# Patient Record
Sex: FEMALE | Race: WHITE | Employment: OTHER | URBAN - METROPOLITAN AREA
[De-identification: names, ages, dates, MRNs, and addresses within clinical notes are randomized per-mention and may not be internally consistent; named-entity substitution may affect disease eponyms.]

---

## 2018-11-06 DIAGNOSIS — Z12.4 SCREENING FOR MALIGNANT NEOPLASM OF CERVIX: Primary | ICD-10-CM

## 2018-11-07 ENCOUNTER — HOSPITAL ENCOUNTER (OUTPATIENT)
Facility: CLINIC | Age: 43
End: 2018-11-07
Attending: DENTIST | Admitting: DENTIST
Payer: MEDICARE

## 2018-12-04 RX ORDER — CEFAZOLIN SODIUM 1 G/3ML
1 INJECTION, POWDER, FOR SOLUTION INTRAMUSCULAR; INTRAVENOUS SEE ADMIN INSTRUCTIONS
Status: CANCELLED | OUTPATIENT
Start: 2018-12-04

## 2018-12-04 RX ORDER — POLYETHYLENE GLYCOL 3350 17 G/17G
1 POWDER, FOR SOLUTION ORAL DAILY PRN
COMMUNITY
End: 2018-12-06 | Stop reason: HOSPADM

## 2018-12-04 RX ORDER — CEFAZOLIN SODIUM 2 G/100ML
2 INJECTION, SOLUTION INTRAVENOUS
Status: CANCELLED | OUTPATIENT
Start: 2018-12-04

## 2019-07-22 DIAGNOSIS — Z12.4 SCREENING FOR CERVICAL CANCER: Primary | ICD-10-CM

## 2019-08-06 RX ORDER — POLYETHYLENE GLYCOL 3350 17 G/17G
1 POWDER, FOR SOLUTION ORAL 2 TIMES DAILY
COMMUNITY

## 2019-08-06 RX ORDER — ACETAMINOPHEN 325 MG/1
325-650 TABLET ORAL EVERY 6 HOURS PRN
COMMUNITY

## 2019-08-06 RX ORDER — PRIMIDONE 50 MG/1
300 TABLET ORAL 2 TIMES DAILY
Status: ON HOLD | COMMUNITY
End: 2024-04-11

## 2019-08-06 RX ORDER — CALAMINE, MENTHOL, WHITE PETROLATUM, ZINC OXIDE .5; .2; 69; 19.5 G/100G; G/100G; G/100G; G/100G
PASTE TOPICAL 4 TIMES DAILY PRN
COMMUNITY

## 2019-08-06 RX ORDER — BISACODYL 10 MG
10 SUPPOSITORY, RECTAL RECTAL DAILY PRN
COMMUNITY

## 2019-08-06 RX ORDER — PRIMIDONE 50 MG/1
300 TABLET ORAL 2 TIMES DAILY
COMMUNITY

## 2019-08-06 RX ORDER — CIPROFLOXACIN AND DEXAMETHASONE 3; 1 MG/ML; MG/ML
4 SUSPENSION/ DROPS AURICULAR (OTIC) PRN
COMMUNITY

## 2019-08-06 RX ORDER — MORPHINE SULFATE 20 MG/5ML
20 SOLUTION ORAL EVERY 12 HOURS PRN
COMMUNITY
End: 2024-04-03

## 2019-08-06 RX ORDER — NYSTATIN 100000 U/G
CREAM TOPICAL AT BEDTIME
COMMUNITY

## 2019-08-06 RX ORDER — FLUCONAZOLE 40 MG/ML
POWDER, FOR SUSPENSION ORAL DAILY
COMMUNITY
End: 2024-04-03

## 2019-08-06 RX ORDER — MEDROXYPROGESTERONE ACETATE 150 MG/ML
150 INJECTION, SUSPENSION INTRAMUSCULAR
COMMUNITY

## 2019-08-06 RX ORDER — LEVETIRACETAM 100 MG/ML
20 SOLUTION ORAL 2 TIMES DAILY
COMMUNITY

## 2019-08-06 RX ORDER — BACLOFEN 10 MG/1
15 TABLET ORAL 3 TIMES DAILY
COMMUNITY

## 2019-08-07 ENCOUNTER — DOCUMENTATION ONLY (OUTPATIENT)
Dept: OTHER | Facility: CLINIC | Age: 44
End: 2019-08-07

## 2019-08-07 ENCOUNTER — ANESTHESIA EVENT (OUTPATIENT)
Dept: SURGERY | Facility: CLINIC | Age: 44
End: 2019-08-07
Payer: MEDICARE

## 2019-08-08 ENCOUNTER — HOSPITAL ENCOUNTER (OUTPATIENT)
Facility: CLINIC | Age: 44
Discharge: HOME | End: 2019-08-08
Attending: OBSTETRICS & GYNECOLOGY | Admitting: OBSTETRICS & GYNECOLOGY
Payer: MEDICARE

## 2019-08-08 ENCOUNTER — APPOINTMENT (OUTPATIENT)
Dept: CT IMAGING | Facility: CLINIC | Age: 44
End: 2019-08-08
Attending: DENTIST
Payer: MEDICARE

## 2019-08-08 ENCOUNTER — ANESTHESIA (OUTPATIENT)
Dept: SURGERY | Facility: CLINIC | Age: 44
End: 2019-08-08
Payer: MEDICARE

## 2019-08-08 VITALS
TEMPERATURE: 97.3 F | DIASTOLIC BLOOD PRESSURE: 107 MMHG | HEART RATE: 97 BPM | SYSTOLIC BLOOD PRESSURE: 146 MMHG | WEIGHT: 67.9 LBS | RESPIRATION RATE: 14 BRPM | OXYGEN SATURATION: 99 % | HEIGHT: 58 IN | BODY MASS INDEX: 14.25 KG/M2

## 2019-08-08 DIAGNOSIS — K05.6 PERIODONTAL DISEASE: Primary | ICD-10-CM

## 2019-08-08 LAB
B-HCG SERPL-ACNC: <1 IU/L (ref 0–5)
GLUCOSE SERPL-MCNC: 80 MG/DL (ref 70–99)

## 2019-08-08 PROCEDURE — 87624 HPV HI-RISK TYP POOLED RSLT: CPT | Performed by: OBSTETRICS & GYNECOLOGY

## 2019-08-08 PROCEDURE — 25000566 ZZH SEVOFLURANE, EA 15 MIN: Performed by: OBSTETRICS & GYNECOLOGY

## 2019-08-08 PROCEDURE — 25000128 H RX IP 250 OP 636: Performed by: NURSE ANESTHETIST, CERTIFIED REGISTERED

## 2019-08-08 PROCEDURE — 71000014 ZZH RECOVERY PHASE 1 LEVEL 2 FIRST HR: Performed by: OBSTETRICS & GYNECOLOGY

## 2019-08-08 PROCEDURE — 36415 COLL VENOUS BLD VENIPUNCTURE: CPT | Performed by: OBSTETRICS & GYNECOLOGY

## 2019-08-08 PROCEDURE — 88175 CYTOPATH C/V AUTO FLUID REDO: CPT | Performed by: OBSTETRICS & GYNECOLOGY

## 2019-08-08 PROCEDURE — 36000051 ZZH SURGERY LEVEL 2 1ST 30 MIN - UMMC: Performed by: OBSTETRICS & GYNECOLOGY

## 2019-08-08 PROCEDURE — 37000009 ZZH ANESTHESIA TECHNICAL FEE, EACH ADDTL 15 MIN: Performed by: OBSTETRICS & GYNECOLOGY

## 2019-08-08 PROCEDURE — 37000008 ZZH ANESTHESIA TECHNICAL FEE, 1ST 30 MIN: Performed by: OBSTETRICS & GYNECOLOGY

## 2019-08-08 PROCEDURE — 25800030 ZZH RX IP 258 OP 636: Performed by: STUDENT IN AN ORGANIZED HEALTH CARE EDUCATION/TRAINING PROGRAM

## 2019-08-08 PROCEDURE — 25000125 ZZHC RX 250: Performed by: STUDENT IN AN ORGANIZED HEALTH CARE EDUCATION/TRAINING PROGRAM

## 2019-08-08 PROCEDURE — 25000132 ZZH RX MED GY IP 250 OP 250 PS 637: Mod: GY | Performed by: DENTIST

## 2019-08-08 PROCEDURE — 70486 CT MAXILLOFACIAL W/O DYE: CPT

## 2019-08-08 PROCEDURE — 25000128 H RX IP 250 OP 636: Performed by: DENTIST

## 2019-08-08 PROCEDURE — 71000027 ZZH RECOVERY PHASE 2 EACH 15 MINS: Performed by: OBSTETRICS & GYNECOLOGY

## 2019-08-08 PROCEDURE — 82947 ASSAY GLUCOSE BLOOD QUANT: CPT | Performed by: OBSTETRICS & GYNECOLOGY

## 2019-08-08 PROCEDURE — 40000171 ZZH STATISTIC PRE-PROCEDURE ASSESSMENT III: Performed by: OBSTETRICS & GYNECOLOGY

## 2019-08-08 PROCEDURE — 27210794 ZZH OR GENERAL SUPPLY STERILE: Performed by: OBSTETRICS & GYNECOLOGY

## 2019-08-08 PROCEDURE — 84702 CHORIONIC GONADOTROPIN TEST: CPT | Performed by: OBSTETRICS & GYNECOLOGY

## 2019-08-08 PROCEDURE — 25000125 ZZHC RX 250: Performed by: NURSE ANESTHETIST, CERTIFIED REGISTERED

## 2019-08-08 PROCEDURE — G0476 HPV COMBO ASSAY CA SCREEN: HCPCS | Performed by: OBSTETRICS & GYNECOLOGY

## 2019-08-08 PROCEDURE — 36000053 ZZH SURGERY LEVEL 2 EA 15 ADDTL MIN - UMMC: Performed by: OBSTETRICS & GYNECOLOGY

## 2019-08-08 PROCEDURE — 25000128 H RX IP 250 OP 636: Performed by: STUDENT IN AN ORGANIZED HEALTH CARE EDUCATION/TRAINING PROGRAM

## 2019-08-08 RX ORDER — MEPERIDINE HYDROCHLORIDE 25 MG/ML
12.5 INJECTION INTRAMUSCULAR; INTRAVENOUS; SUBCUTANEOUS
Status: DISCONTINUED | OUTPATIENT
Start: 2019-08-08 | End: 2019-08-08 | Stop reason: HOSPADM

## 2019-08-08 RX ORDER — CEFAZOLIN SODIUM 500 MG/2.2ML
500 INJECTION, POWDER, FOR SOLUTION INTRAMUSCULAR; INTRAVENOUS SEE ADMIN INSTRUCTIONS
Status: DISCONTINUED | OUTPATIENT
Start: 2019-08-08 | End: 2019-08-08 | Stop reason: HOSPADM

## 2019-08-08 RX ORDER — NALOXONE HYDROCHLORIDE 0.4 MG/ML
.1-.4 INJECTION, SOLUTION INTRAMUSCULAR; INTRAVENOUS; SUBCUTANEOUS
Status: DISCONTINUED | OUTPATIENT
Start: 2019-08-08 | End: 2019-08-08 | Stop reason: HOSPADM

## 2019-08-08 RX ORDER — ONDANSETRON 4 MG/1
4 TABLET, ORALLY DISINTEGRATING ORAL EVERY 30 MIN PRN
Status: DISCONTINUED | OUTPATIENT
Start: 2019-08-08 | End: 2019-08-08 | Stop reason: HOSPADM

## 2019-08-08 RX ORDER — LIDOCAINE HYDROCHLORIDE 20 MG/ML
INJECTION, SOLUTION INFILTRATION; PERINEURAL PRN
Status: DISCONTINUED | OUTPATIENT
Start: 2019-08-08 | End: 2019-08-08

## 2019-08-08 RX ORDER — CHLORHEXIDINE GLUCONATE ORAL RINSE 1.2 MG/ML
SOLUTION DENTAL
Qty: 473 ML | Refills: 0 | Status: SHIPPED | OUTPATIENT
Start: 2019-08-08

## 2019-08-08 RX ORDER — PROPOFOL 10 MG/ML
INJECTION, EMULSION INTRAVENOUS PRN
Status: DISCONTINUED | OUTPATIENT
Start: 2019-08-08 | End: 2019-08-08

## 2019-08-08 RX ORDER — GLYCOPYRROLATE 0.2 MG/ML
INJECTION, SOLUTION INTRAMUSCULAR; INTRAVENOUS PRN
Status: DISCONTINUED | OUTPATIENT
Start: 2019-08-08 | End: 2019-08-08

## 2019-08-08 RX ORDER — CHLORHEXIDINE GLUCONATE ORAL RINSE 1.2 MG/ML
SOLUTION DENTAL PRN
Status: DISCONTINUED | OUTPATIENT
Start: 2019-08-08 | End: 2019-08-08 | Stop reason: HOSPADM

## 2019-08-08 RX ORDER — ONDANSETRON 2 MG/ML
4 INJECTION INTRAMUSCULAR; INTRAVENOUS EVERY 30 MIN PRN
Status: DISCONTINUED | OUTPATIENT
Start: 2019-08-08 | End: 2019-08-08 | Stop reason: HOSPADM

## 2019-08-08 RX ORDER — ONDANSETRON 2 MG/ML
INJECTION INTRAMUSCULAR; INTRAVENOUS PRN
Status: DISCONTINUED | OUTPATIENT
Start: 2019-08-08 | End: 2019-08-08

## 2019-08-08 RX ORDER — CEFAZOLIN SODIUM 500 MG/2.2ML
500 INJECTION, POWDER, FOR SOLUTION INTRAMUSCULAR; INTRAVENOUS
Status: COMPLETED | OUTPATIENT
Start: 2019-08-08 | End: 2019-08-08

## 2019-08-08 RX ORDER — DEXAMETHASONE SODIUM PHOSPHATE 4 MG/ML
INJECTION, SOLUTION INTRA-ARTICULAR; INTRALESIONAL; INTRAMUSCULAR; INTRAVENOUS; SOFT TISSUE PRN
Status: DISCONTINUED | OUTPATIENT
Start: 2019-08-08 | End: 2019-08-08

## 2019-08-08 RX ORDER — FENTANYL CITRATE 50 UG/ML
25-50 INJECTION, SOLUTION INTRAMUSCULAR; INTRAVENOUS
Status: DISCONTINUED | OUTPATIENT
Start: 2019-08-08 | End: 2019-08-08 | Stop reason: HOSPADM

## 2019-08-08 RX ORDER — LIDOCAINE HYDROCHLORIDE 40 MG/ML
INJECTION, SOLUTION RETROBULBAR PRN
Status: DISCONTINUED | OUTPATIENT
Start: 2019-08-08 | End: 2019-08-08

## 2019-08-08 RX ORDER — KETAMINE HYDROCHLORIDE 10 MG/ML
INJECTION, SOLUTION INTRAMUSCULAR; INTRAVENOUS PRN
Status: DISCONTINUED | OUTPATIENT
Start: 2019-08-08 | End: 2019-08-08

## 2019-08-08 RX ORDER — PROPOFOL 10 MG/ML
INJECTION, EMULSION INTRAVENOUS CONTINUOUS PRN
Status: DISCONTINUED | OUTPATIENT
Start: 2019-08-08 | End: 2019-08-08

## 2019-08-08 RX ORDER — KETOROLAC TROMETHAMINE 30 MG/ML
INJECTION, SOLUTION INTRAMUSCULAR; INTRAVENOUS PRN
Status: DISCONTINUED | OUTPATIENT
Start: 2019-08-08 | End: 2019-08-08

## 2019-08-08 RX ORDER — KETOROLAC TROMETHAMINE 30 MG/ML
30 INJECTION, SOLUTION INTRAMUSCULAR; INTRAVENOUS EVERY 6 HOURS PRN
Status: DISCONTINUED | OUTPATIENT
Start: 2019-08-08 | End: 2019-08-08 | Stop reason: HOSPADM

## 2019-08-08 RX ORDER — FENTANYL CITRATE 50 UG/ML
INJECTION, SOLUTION INTRAMUSCULAR; INTRAVENOUS PRN
Status: DISCONTINUED | OUTPATIENT
Start: 2019-08-08 | End: 2019-08-08

## 2019-08-08 RX ORDER — SODIUM CHLORIDE, SODIUM LACTATE, POTASSIUM CHLORIDE, CALCIUM CHLORIDE 600; 310; 30; 20 MG/100ML; MG/100ML; MG/100ML; MG/100ML
INJECTION, SOLUTION INTRAVENOUS CONTINUOUS
Status: DISCONTINUED | OUTPATIENT
Start: 2019-08-08 | End: 2019-08-08 | Stop reason: HOSPADM

## 2019-08-08 RX ORDER — SODIUM CHLORIDE, SODIUM LACTATE, POTASSIUM CHLORIDE, CALCIUM CHLORIDE 600; 310; 30; 20 MG/100ML; MG/100ML; MG/100ML; MG/100ML
INJECTION, SOLUTION INTRAVENOUS CONTINUOUS PRN
Status: DISCONTINUED | OUTPATIENT
Start: 2019-08-08 | End: 2019-08-08

## 2019-08-08 RX ADMIN — ROCURONIUM BROMIDE 10 MG: 10 INJECTION INTRAVENOUS at 12:31

## 2019-08-08 RX ADMIN — CEFAZOLIN 500 MG: 225 INJECTION, POWDER, FOR SOLUTION INTRAMUSCULAR; INTRAVENOUS at 14:44

## 2019-08-08 RX ADMIN — SUGAMMADEX 60 MG: 100 INJECTION, SOLUTION INTRAVENOUS at 15:29

## 2019-08-08 RX ADMIN — ROCURONIUM BROMIDE 10 MG: 10 INJECTION INTRAVENOUS at 14:18

## 2019-08-08 RX ADMIN — FENTANYL CITRATE 25 MCG: 50 INJECTION, SOLUTION INTRAMUSCULAR; INTRAVENOUS at 13:57

## 2019-08-08 RX ADMIN — LIDOCAINE HYDROCHLORIDE 3 ML: 40 INJECTION, SOLUTION RETROBULBAR; TOPICAL at 12:15

## 2019-08-08 RX ADMIN — LIDOCAINE HYDROCHLORIDE 5 ML: 20 INJECTION, SOLUTION INFILTRATION; PERINEURAL at 12:15

## 2019-08-08 RX ADMIN — KETOROLAC TROMETHAMINE 15 MG: 30 INJECTION, SOLUTION INTRAMUSCULAR at 15:02

## 2019-08-08 RX ADMIN — GLYCOPYRROLATE 0.2 MG: 0.2 INJECTION, SOLUTION INTRAMUSCULAR; INTRAVENOUS at 11:56

## 2019-08-08 RX ADMIN — ROCURONIUM BROMIDE 10 MG: 10 INJECTION INTRAVENOUS at 13:38

## 2019-08-08 RX ADMIN — ROCURONIUM BROMIDE 10 MG: 10 INJECTION INTRAVENOUS at 13:15

## 2019-08-08 RX ADMIN — CEFAZOLIN 500 MG: 225 INJECTION, POWDER, FOR SOLUTION INTRAMUSCULAR; INTRAVENOUS at 12:42

## 2019-08-08 RX ADMIN — KETAMINE HYDROCHLORIDE 15 MG: 10 INJECTION, SOLUTION INTRAMUSCULAR; INTRAVENOUS at 11:56

## 2019-08-08 RX ADMIN — FENTANYL CITRATE 25 MCG: 50 INJECTION, SOLUTION INTRAMUSCULAR; INTRAVENOUS at 13:07

## 2019-08-08 RX ADMIN — ROCURONIUM BROMIDE 10 MG: 10 INJECTION INTRAVENOUS at 14:39

## 2019-08-08 RX ADMIN — ROCURONIUM BROMIDE 20 MG: 10 INJECTION INTRAVENOUS at 12:41

## 2019-08-08 RX ADMIN — LIDOCAINE HYDROCHLORIDE 3 ML: 40 INJECTION, SOLUTION RETROBULBAR; TOPICAL at 11:56

## 2019-08-08 RX ADMIN — ONDANSETRON 4 MG: 2 INJECTION INTRAMUSCULAR; INTRAVENOUS at 14:51

## 2019-08-08 RX ADMIN — PROPOFOL 80 MG: 10 INJECTION, EMULSION INTRAVENOUS at 12:20

## 2019-08-08 RX ADMIN — PROPOFOL 75 MCG/KG/MIN: 10 INJECTION, EMULSION INTRAVENOUS at 13:51

## 2019-08-08 RX ADMIN — KETAMINE HYDROCHLORIDE 10 MG: 10 INJECTION, SOLUTION INTRAMUSCULAR; INTRAVENOUS at 12:05

## 2019-08-08 RX ADMIN — SODIUM CHLORIDE, POTASSIUM CHLORIDE, SODIUM LACTATE AND CALCIUM CHLORIDE: 600; 310; 30; 20 INJECTION, SOLUTION INTRAVENOUS at 11:56

## 2019-08-08 RX ADMIN — DEXAMETHASONE SODIUM PHOSPHATE 4 MG: 4 INJECTION, SOLUTION INTRAMUSCULAR; INTRAVENOUS at 12:43

## 2019-08-08 RX ADMIN — MIDAZOLAM 0.5 MG: 1 INJECTION INTRAMUSCULAR; INTRAVENOUS at 11:56

## 2019-08-08 RX ADMIN — MIDAZOLAM 0.5 MG: 1 INJECTION INTRAMUSCULAR; INTRAVENOUS at 12:05

## 2019-08-08 ASSESSMENT — MIFFLIN-ST. JEOR: SCORE: 852.75

## 2019-08-08 NOTE — BRIEF OP NOTE
[facility], [place]    Brief Operative Note    Pre-operative diagnosis: Dental Caries, Periodontal Disease  Post-operative diagnosis Periodontal disease, severe trismus  Procedure: Procedure(s):  Breast and Pelvic Exam Under Anesthesia, Pap Smear  Bilateral Breast Exam Under Anesthesia  Dental Exam, Periodontal Therapy and Fluoride Treatment  Surgeon: Surgeon(s) and Role:  Panel 1:     * Hortensia Recio MD - Primary     * Jelena Porter MD - Resident - Assisting  Panel 2:     * Hortensia Recio MD - Primary  Panel 3:     * Ladonna Colorado DDS - Primary      * Lorenzo Aranda DDS- Resident- Assisting  Anesthesia: General NETT  Estimated blood loss: 5 mL  Drains: None  Specimens:   ID Type Source Tests Collected by Time Destination   1 : pap smear Brushing Cervix HPV HIGH RISK TYPES DNA CERVICAL, PAP IMAGED THIN LAYER, DIAGNOSTIC Hortensia Recio MD 8/8/2019 12:22 PM      Findings:   None.  Complications: None.  Implants:  No implants   The patient was transported to radiology for Dental CT

## 2019-08-08 NOTE — DISCHARGE INSTRUCTIONS
Same-Day Surgery   Adult Discharge Orders & Instructions     For 24 hours after surgery:  1. Get plenty of rest.  A responsible adult must stay with you for at least 24 hours after you leave the hospital.   2. Pain medication can slow your reflexes. Do not drive or use heavy equipment.  If you have weakness or tingling, don't drive or use heavy equipment until this feeling goes away.  3. Mixing alcohol and pain medication can cause dizziness and slow your breathing. It can even be fatal. Do not drink alcohol while taking pain medication.  4. Avoid strenuous or risky activities.  Ask for help when climbing stairs.   5. You may feel lightheaded.  If so, sit for a few minutes before standing.  Have someone help you get up.   6. If you have nausea (feel sick to your stomach), drink only clear liquids such as apple juice, ginger ale, broth or 7-Up.  Rest may also help.  Be sure to drink enough fluids.  Move to a regular diet as you feel able. Take pain medications with a small amount of solid food, such as toast or crackers, to avoid nausea.   7. A slight fever is normal. Call the doctor if your fever is over 100 F (37.7 C) (taken under the tongue) or lasts longer than 24 hours.  8. You may have a dry mouth, muscle aches, trouble sleeping or a sore throat.  These symptoms should go away after 24 hours.  9. Do not make important or legal decisions.   Pain Management:      1. Take pain medication (if prescribed) for pain as directed by your physician.        2. WARNING: If the pain medication you have been prescribed contains Tylenol  (acetaminophen), DO NOT take additional doses of Tylenol (acetaminophen).     Call your doctor for any of the followin.  Signs of infection (fever, growing tenderness at the surgery site, severe pain, a large amount of drainage or bleeding, foul-smelling drainage, redness, swelling).    2.  It has been over 8 to 10 hours since surgery and you are still not able to urinate (pee).    3.   Headache for over 24 hours.    4.  Numbness, tingling or weakness the day after surgery (if you had spinal anesthesia).  To contact a doctor, call _____________________________________ or:      [phone] and ask for the Resident On Call for:          __________________________________________ (answered 24 hours a day)      Emergency Department:  [facility]: [phone]  [facility]: [phone]               Rev. 10/2014     If you use hormonal birth control (such as the pill, patch, ring or implants):  You will need a second form of birth control for 7 days (condoms, a diaphragm or contraceptive foam).  While in the surgery center, you received a medicine called Sugammadex.  Hormonal birth control (such as the pill, patch, ring or implants) may not work as well for a week after taking this medicine.

## 2019-08-08 NOTE — ANESTHESIA PREPROCEDURE EVALUATION
Anesthesia Pre-Procedure Evaluation    Patient: Ivanna Davis   MRN:     [id number] Gender:   female   Age:    44 year old :      1975        Preoperative Diagnosis: Dental Caries, Periodontal Disease   Procedure(s):  Pelvic Exam Under Anesthesia, Pap Smear  Bilateral Breast Exam Under Anesthesia  Dental Exam, Restorations, Radiographs, Dental Extractions, Periodontal Therapy and Biopsies     Past Medical History:   Diagnosis Date     Generalized convulsive epilepsy (H)      Infantile cerebral palsy (H)      Kyphoscoliosis      Mental retardation       Past Surgical History:   Procedure Laterality Date     BACK SURGERY       IR GASTRO JEJUNOSTOMY TUBE PLACEMENT            Anesthesia Evaluation     . Pt has had prior anesthetic. Type: General    History of anesthetic complications   - difficult intubation        ROS/MED HX    ENT/Pulmonary:  - neg pulmonary ROS     Neurologic:       Cardiovascular:         METS/Exercise Tolerance:     Hematologic:         Musculoskeletal:         GI/Hepatic:         Renal/Genitourinary:         Endo:         Psychiatric:         Infectious Disease:         Malignancy:         Other:                     JZG FV AN PHYSICAL EXAM    LABS:  CBC: No results found for: WBC, HGB, HCT, PLT  BMP: No results found for: NA, POTASSIUM, CHLORIDE, CO2, BUN, CR, GLC  COAGS: No results found for: PTT, INR, FIBR  POC: No results found for: BGM, HCG, HCGS  OTHER: No results found for: PH, LACT, A1C, DAVID, PHOS, MAG, ALBUMIN, PROTTOTAL, ALT, AST, GGT, ALKPHOS, BILITOTAL, BILIDIRECT, LIPASE, AMYLASE, CLAUDIA, TSH, T4, T3, CRP, SED     Preop Vitals    BP Readings from Last 3 Encounters:   No data found for BP    Pulse Readings from Last 3 Encounters:   No data found for Pulse      Resp Readings from Last 3 Encounters:   No data found for Resp    SpO2 Readings from Last 3 Encounters:   No data found for SpO2      Temp Readings from Last 1 Encounters:   No data found for Temp    Ht Readings from Last  1 Encounters:   No data found for Ht      Wt Readings from Last 1 Encounters:   No data found for Wt    There is no height or weight on file to calculate BMI.     LDA:        Assessment:   ASA SCORE: 3    H&P: History and physical reviewed and following examination; no interval change.   Smoking Status:  Non-Smoker/Unknown   NPO Status: NPO Appropriate     Plan:   Anes. Type:  General   Pre-Medication: None   Induction:  IV (Standard)   Airway: ETT; FOB; Nasal   Access/Monitoring: PIV   Maintenance: Balanced     Postop Plan:   Postop Pain: Opioids; NSAID  Postop Sedation/Airway: Not planned  Disposition: Outpatient     PONV Management:   Adult Risk Factors: Female, Non-Smoker, Postop Opioids   Prevention: Ondansetron, Dexamethasone                   Dorene Fuentes MD

## 2019-08-08 NOTE — ANESTHESIA POSTPROCEDURE EVALUATION
Anesthesia POST Procedure Evaluation    Patient: Ivanna Davis   MRN:     [id number] Gender:   female   Age:    44 year old :      1975        Preoperative Diagnosis: Dental Caries, Periodontal Disease   Procedure(s):  Breast and Pelvic Exam Under Anesthesia, Pap Smear  Bilateral Breast Exam Under Anesthesia  Dental Exam, Periodontal Therapy and Fluoride Treatment   Postop Comments: No value filed.       Anesthesia Type:  Not documented  General    Reportable Event: NO     PAIN: Uncomplicated   Sign Out status: Comfortable, Well controlled pain     PONV: No PONV   Sign Out status:  No Nausea or Vomiting     Neuro/Psych: Uneventful perioperative course   Sign Out Status: Preoperative baseline; Age appropriate mentation     Airway/Resp.: Uneventful perioperative course   Sign Out Status: Non labored breathing, age appropriate RR; Resp. Status within EXPECTED Parameters     CV: Uneventful perioperative course   Sign Out status: Appropriate BP and perfusion indices; Appropriate HR/Rhythm     Disposition:   Sign Out in:  PACU  Disposition:  Phase II; Home  Recovery Course: Uneventful  Follow-Up: Not required     Comments/Narrative:  Patient doing well post-operatively.  No significant issues.  Hemodynamically stable, pain well controlled, nausea well controlled.  Stable for discharge from the PACU             Last Anesthesia Record Vitals:  CRNA VITALS  2019 1440 - 2019 1540      2019             Pulse:  84    Ht Rate:  82    SpO2:  100 %    Resp Rate (observed):  11          Last PACU Vitals:  Vitals Value Taken Time   /85 2019  4:15 PM   Temp 36.4  C (97.5  F) 2019  4:15 PM   Pulse 58 2019  4:15 PM   Resp 57 2019  4:24 PM   SpO2 99 % 2019  4:27 PM   Temp src     NIBP     Pulse     SpO2     Resp     Temp     Ht Rate     Temp 2     Vitals shown include unvalidated device data.      Electronically Signed By: Dontae Alvarado MD, 2019, 4:28 PM

## 2019-08-08 NOTE — LETTER
8/15/2019         Ivanna Davis   [address]  [facility] [address]        Dear Ms. Davis's mother and care team:    The results of Ivanna's recent Pap smear and HPV were normal. This is good for 5 years or longer as per our discussion.     Results for orders placed or performed during the hospital encounter of 08/08/19   CT Dental wo Contrast    Narrative    CT DENTAL WO CONTRAST 8/8/2019 3:28 PM    History:  45 yo female with CP and severe trismus, with a hard stop  lolis opening.  Max opening is 14 mm.  Possible skeletal deformity  preventing jaw movement    Comparison:  None available.      TECHNIQUE: Multiplanar axial CT images through the maxilla and  mandible were obtained without the administration of intravenous  contrast.    FINDINGS:  No significant soft tissue swelling or mass of the mandible or  maxilla. No bony erosion. Alignment of the visualized portions of the  facial bones appears normal. No abnormality of the temporomandibular  joints. Maloriented right maxillary canine. Mild periapical lucency  associated with the roots of the first right maxillary premolar.  Unerupted versus partially erupted bilateral maxillary and mandibular  posterior molars.    Mild frontal and sphenoid sinus mucosal thickening. Partially left  nasal intubation.      Impression    IMPRESSION:  1. No osseous temporomandibular joint anomaly.  2. Maloriented right maxillary canine.  3. Mild periapical abscess associated with the first right maxillary  premolar.    MICHAELA JARRELL MD   HCG quantitative pregnancy   Result Value Ref Range    HCG Quantitative Serum <1 0 - 5 IU/L   Glucose   Result Value Ref Range    Glucose 80 70 - 99 mg/dL   HPV High Risk Types DNA Cervical   Result Value Ref Range    HPV Source SurePath     HPV 16 DNA Negative NEG^Negative    HPV 18 DNA Negative NEG^Negative    Other HR HPV Negative NEG^Negative    Final Diagnosis This patient's sample is negative for HPV DNA.     Specimen  Description Cervical Cells    PAP imaged thin layer, diagnostic   Result Value Ref Range    PAP NIL     Copath Report         Patient Name: REBECCA QUINN  MR#: [id number]  Specimen #: [id number]  Collected: 8/8/2019  Received: 8/8/2019  Reported: 8/12/2019 09:54  Ordering Phy(s): JAZMIN VENCES    For improved result formatting, select 'View Enhanced Report Format' under   Linked Documents section.    SPECIMEN/STAIN PROCESS:  Pap Imaged thin layer prep diagnostic (SurePath, FocalPoint with guided   screening)       Pap-Cyto x 1, HPV ordered x 1    SOURCE: Cervical  ----------------------------------------------------------------   Pap Imaged thin layer prep diagnostic (SurePath, FocalPoint with guided   screening)  SPECIMEN ADEQUACY:  Satisfactory for evaluation.  -Transformation zone component absent.    CYTOLOGIC INTERPRETATION:    Negative for intraepithelial lesion or malignancy    Electronically signed out by:  FAUZIA Valles (ASCP)    CLINICAL HISTORY:    Exam Note:: Dental caries and periodontal disease,    Papanicolaou Test Limitations:  Cervical cytology is a screening test with   limited s ensitivity; regular  screening is critical for cancer prevention; Pap tests are primarily   effective for the diagnosis/prevention of  squamous cell carcinoma, not adenocarcinomas or other cancers.    The technical component of this testing was completed at the [facility], with the professional component performed   at the [facility], [address] [place] [phone] ([phone])    COLLECTION SITE:  Client:  [facility]  Location: [facility] (B)             Please note that test explanations are brief and do not reflect all diagnostic uses.  If you have any questions or concerns, please call the clinic at [phone].      Sincerely,      Jazmin  MD Hemal

## 2019-08-08 NOTE — ANESTHESIA CARE TRANSFER NOTE
Patient: Ivanna Davis    Procedure(s):  Breast and Pelvic Exam Under Anesthesia, Pap Smear  Bilateral Breast Exam Under Anesthesia  Dental Exam, Periodontal Therapy and Fluoride Treatment    Diagnosis: Dental Caries, Periodontal Disease  Diagnosis Additional Information: No value filed.    Anesthesia Type:   General     Note:  Airway :Nasal Cannula  Patient transferred to:PACU  Comments: Neuromuscular blockade reversed after TOF 4/4, spontaneous respirations, adequate tidal volumes, followed commands to voice, extubated atraumatically, airway patent after extubation.  Oxygen via nasal cannula at 3 liters per minute to PACU. Oxygen tubing connected to wall O2 in PACU, SpO2, NiBP, and EKG monitors and alarms on and functioning, report on patient's clinical status given to PACU RN, RN questions answered. Handoff Report: Identifed the Patient, Identified the Reponsible Provider, Reviewed the pertinent medical history, Discussed the surgical course, Reviewed Intra-OP anesthesia mangement and issues during anesthesia, Set expectations for post-procedure period and Allowed opportunity for questions and acknowledgement of understanding      Vitals: (Last set prior to Anesthesia Care Transfer)    CRNA VITALS  8/8/2019 1440 - 8/8/2019 1540      8/8/2019             Pulse:  84    Ht Rate:  82    SpO2:  100 %    Resp Rate (observed):  11                Electronically Signed By: ZULEYMA Yoon CRNA  August 8, 2019  3:46 PM

## 2019-08-08 NOTE — OP NOTE
[facility]  Full Operative Note   Date of Service: 8/8/2019  Surgeon:  Hortensia Recio MD  Assistants:  Jelena Porter MD PGY1   Preop Dx:  Cerebral Palsy    Epilepsy    Intellectual disability    Requiring cervical cancer screening  Postop Dx:  Same, s/p pap smear    Procedure:  EUA, pap smear    Anesthesia: See anesthesia note  EBL:  None cc  IVF:  See dentistry note  UOP:  None  Specimens:  Endovaginal pap smear   Complications:  None apparent    Indications:   Ms. Ivanna Davis is a 44 year old with hx of CP and intellectual disability, who presented with need for cervical cancer screening and breast exam. Has never had a pelvic exam or pap smear. Also has never had a breast exam. Currently does not get a period on regular Depo provera. Exam under anesthesia and pap smear and breast exam were recommended. The risks, benefits, and alternatives were discussed with the patient's legal guardian (her mother, Lorena), and she agreed to proceed. Also receiving dental exam and cleaning at this time.     Findings:   Complete contracture of upper and lower extremities limiting positioning and pelvic exam.   EUA: Normal appearing external genitalia. Bimanual exam with very small 2-3cm uterus. Normal appearing vaginal mucosa. Unable to completely visualize cervix given patient positioning. Cervix seems to be flush with vaginal mucosa.  Reticular erythematous rash over right side of chest wall. Shared with anesthesia.   Breast Exam: Breasts smooth to palpation bilaterally. No nodularity or masses noted.     Procedure Details:   The patient was brought to the operating room where adequate anesthesia was administered. Exam under anesthesia revealed the findings noted above. A leo pediatric speculum was placed. An endovaginal pap smear was obtained. A breast exam under anesthesia was performed with above findings.     The patient tolerated the procedure well and was then prepared for dental procedure.   Hemal was present for the entirety of the procedure.     Jelena Porter MD PGY1  Obstetrics & Gynecology  08/08/19     Please fax pap smear results to patient's home/primary provider at [facility], Dr. Pretty Lancaster. Phone [phone], Fax [phone].     I was present for and supervised the entire procedure and exam.     Hortensia Recio

## 2019-08-09 NOTE — OP NOTE
Procedure Date: 08/08/2019      It was deemed necessary for this patient to be seen in the hospital operating room because of patient history of cerebral palsy and convulsive epilepsy and inability to be treated in a traditional dental clinic setting.  Risks, complications including but not limited to postoperative pain, swelling, bleeding, infection, temporary or permanent paresthesia or anesthesia of cranial nerve V3 lingual nerve, failure to resolve chief complaint, or need for additional procedures.  The patient and patient's guardian agreed to procedure as written.  Signed consent is in the chart.        PROCEDURES PERFORMED:  Under general anesthesia, the following operations were performed in the mouth:   Bilateral dental examination, prophylaxis and gross periodontal debridement:  Periodontal therapy and    fluoride varnish application.      ATTENDING PHYSICIAN:  Ladonna Colorado DDS      FIRST ASSISTANT DENTAL RESIDENT:  Lorenzo Aranda DDS      ANESTHESIOLOGIST:  Dr. Gates      SCRUB NURSE:  Franny.      CIRCULATING NURSE:  Judd.      ANESTHESIOLOGY RESIDENTS:  Dr. Campbell and Dr. Fuentes.        PREOPERATIVE DIAGNOSIS:  Suspected periodontal disease and dental caries.      POSTOPERATIVE DIAGNOSES:     1.  Generalized chronic moderate periodontitis with localized severe chronic periodontitis.   2.  Severe trismus and heavy attrition with limited opening.      DESCRIPTION OF PROCEDURE:  The patient was brought into the operating room and draped in the usual customary [facility] fashion.  Following the timeout procedures,  general anesthesia was administered through the patient's left naris.  The patient was given awake intubation due to difficulty with intubation and opening.  GYN performed a pelvic and breast exam and pap smear then turned the patient over to us.  Please see their operative report for details.  A bilateral dental exam was performed.  A moist throat pack was placed at  13:12.  Clinical examination revealed generalized heavy plaque and supra and subgingival calculus, generalized bleeding on probing,  periodontal pockets ranging from 3-6 mm and a 12 x 3 mm hyperkeratotic raised tissue on the lower lip due to lip biting. Tooth #6 is partially erupted palatally. The max opening is 14 mm with a hard stop.  Radiographic examination was limited to facial CT due to the patient's limited opening following the dental procedures.  Radiographic diagnosis is pending radiologist confirmation.  No apparent dental caries or radiolucencies.        The following procedures were performed:  Periodontal therapy was performed on all teeth using ultrasonic debridement, supragingival and subgingival scaling and root planing with rubber cup polishing and flossing.  Fluoride varnish was applied to all teeth.  The throat pack was removed with suction  at 14:54.  The oropharynx was inspected and found to be clear.  Estimated blood loss was 5 mL.  The attending doctor, Dr. Ian Guan, was present for the entire procedure.  The patient was transported to radiology for a dental CT then  Return to the operating room in order to be extubated.  She was then taken to the post-anesthesia care unit in good condition.         IAN GUAN DDS       As dictated by ALEN PEARL DDS            D: 2019   T: 2019   MT: TWILA      Name:     REBECCA QUINN   MRN:      [id number]        Account:        [id number]   :      1975           Procedure Date: 2019      Document: [id number]

## 2019-08-12 LAB
COPATH REPORT: NORMAL
PAP: NORMAL

## 2019-08-14 LAB
FINAL DIAGNOSIS: NORMAL
HPV HR 12 DNA CVX QL NAA+PROBE: NEGATIVE
HPV16 DNA SPEC QL NAA+PROBE: NEGATIVE
HPV18 DNA SPEC QL NAA+PROBE: NEGATIVE
SPECIMEN DESCRIPTION: NORMAL
SPECIMEN SOURCE CVX/VAG CYTO: NORMAL

## 2022-12-06 ENCOUNTER — ANESTHESIA EVENT (OUTPATIENT)
Dept: SURGERY | Facility: CLINIC | Age: 47
End: 2022-12-06
Payer: MEDICARE

## 2022-12-06 RX ORDER — FERROUS SULFATE 325(65) MG
325 TABLET ORAL 2 TIMES DAILY
COMMUNITY

## 2022-12-06 ASSESSMENT — ENCOUNTER SYMPTOMS: SEIZURES: 1

## 2022-12-07 ENCOUNTER — HOSPITAL ENCOUNTER (OUTPATIENT)
Facility: CLINIC | Age: 47
Discharge: NURSING, REHAB OR LONG-TERM CARE | End: 2022-12-07
Attending: DENTIST | Admitting: DENTIST
Payer: MEDICARE

## 2022-12-07 ENCOUNTER — ANESTHESIA (OUTPATIENT)
Dept: SURGERY | Facility: CLINIC | Age: 47
End: 2022-12-07
Payer: MEDICARE

## 2022-12-07 VITALS
TEMPERATURE: 97.6 F | SYSTOLIC BLOOD PRESSURE: 145 MMHG | OXYGEN SATURATION: 100 % | DIASTOLIC BLOOD PRESSURE: 95 MMHG | RESPIRATION RATE: 13 BRPM | BODY MASS INDEX: 17.59 KG/M2 | HEIGHT: 58 IN | WEIGHT: 83.78 LBS | HEART RATE: 76 BPM

## 2022-12-07 PROCEDURE — 258N000003 HC RX IP 258 OP 636: Performed by: STUDENT IN AN ORGANIZED HEALTH CARE EDUCATION/TRAINING PROGRAM

## 2022-12-07 PROCEDURE — 272N000001 HC OR GENERAL SUPPLY STERILE: Performed by: DENTIST

## 2022-12-07 PROCEDURE — 250N000011 HC RX IP 250 OP 636: Performed by: STUDENT IN AN ORGANIZED HEALTH CARE EDUCATION/TRAINING PROGRAM

## 2022-12-07 PROCEDURE — 250N000025 HC SEVOFLURANE, PER MIN: Performed by: DENTIST

## 2022-12-07 PROCEDURE — 250N000009 HC RX 250: Performed by: STUDENT IN AN ORGANIZED HEALTH CARE EDUCATION/TRAINING PROGRAM

## 2022-12-07 PROCEDURE — 370N000017 HC ANESTHESIA TECHNICAL FEE, PER MIN: Performed by: DENTIST

## 2022-12-07 PROCEDURE — 710N000010 HC RECOVERY PHASE 1, LEVEL 2, PER MIN: Performed by: DENTIST

## 2022-12-07 PROCEDURE — 710N000012 HC RECOVERY PHASE 2, PER MINUTE: Performed by: DENTIST

## 2022-12-07 PROCEDURE — 360N000075 HC SURGERY LEVEL 2, PER MIN: Performed by: DENTIST

## 2022-12-07 PROCEDURE — 999N000141 HC STATISTIC PRE-PROCEDURE NURSING ASSESSMENT: Performed by: DENTIST

## 2022-12-07 RX ORDER — SODIUM CHLORIDE, SODIUM LACTATE, POTASSIUM CHLORIDE, CALCIUM CHLORIDE 600; 310; 30; 20 MG/100ML; MG/100ML; MG/100ML; MG/100ML
INJECTION, SOLUTION INTRAVENOUS CONTINUOUS
Status: DISCONTINUED | OUTPATIENT
Start: 2022-12-07 | End: 2022-12-07 | Stop reason: HOSPADM

## 2022-12-07 RX ORDER — MEPERIDINE HYDROCHLORIDE 25 MG/ML
12.5 INJECTION INTRAMUSCULAR; INTRAVENOUS; SUBCUTANEOUS
Status: DISCONTINUED | OUTPATIENT
Start: 2022-12-07 | End: 2022-12-07 | Stop reason: HOSPADM

## 2022-12-07 RX ORDER — PROPOFOL 10 MG/ML
INJECTION, EMULSION INTRAVENOUS PRN
Status: DISCONTINUED | OUTPATIENT
Start: 2022-12-07 | End: 2022-12-07

## 2022-12-07 RX ORDER — FENTANYL CITRATE 50 UG/ML
INJECTION, SOLUTION INTRAMUSCULAR; INTRAVENOUS PRN
Status: DISCONTINUED | OUTPATIENT
Start: 2022-12-07 | End: 2022-12-07

## 2022-12-07 RX ORDER — DEXAMETHASONE SODIUM PHOSPHATE 4 MG/ML
INJECTION, SOLUTION INTRA-ARTICULAR; INTRALESIONAL; INTRAMUSCULAR; INTRAVENOUS; SOFT TISSUE PRN
Status: DISCONTINUED | OUTPATIENT
Start: 2022-12-07 | End: 2022-12-07

## 2022-12-07 RX ORDER — CHLORHEXIDINE GLUCONATE ORAL RINSE 1.2 MG/ML
10 SOLUTION DENTAL ONCE
Status: DISCONTINUED | OUTPATIENT
Start: 2022-12-07 | End: 2022-12-07 | Stop reason: HOSPADM

## 2022-12-07 RX ORDER — OXYMETAZOLINE HYDROCHLORIDE 0.05 G/100ML
SPRAY NASAL PRN
Status: DISCONTINUED | OUTPATIENT
Start: 2022-12-07 | End: 2022-12-07

## 2022-12-07 RX ORDER — FENTANYL CITRATE 50 UG/ML
25 INJECTION, SOLUTION INTRAMUSCULAR; INTRAVENOUS EVERY 5 MIN PRN
Status: DISCONTINUED | OUTPATIENT
Start: 2022-12-07 | End: 2022-12-07 | Stop reason: HOSPADM

## 2022-12-07 RX ORDER — FENTANYL CITRATE 50 UG/ML
50 INJECTION, SOLUTION INTRAMUSCULAR; INTRAVENOUS EVERY 5 MIN PRN
Status: DISCONTINUED | OUTPATIENT
Start: 2022-12-07 | End: 2022-12-07 | Stop reason: HOSPADM

## 2022-12-07 RX ORDER — ACETAMINOPHEN 325 MG/10.15ML
15 LIQUID ORAL ONCE
Status: DISCONTINUED | OUTPATIENT
Start: 2022-12-07 | End: 2022-12-07 | Stop reason: HOSPADM

## 2022-12-07 RX ORDER — FENTANYL CITRATE 50 UG/ML
25 INJECTION, SOLUTION INTRAMUSCULAR; INTRAVENOUS
Status: DISCONTINUED | OUTPATIENT
Start: 2022-12-07 | End: 2022-12-07 | Stop reason: HOSPADM

## 2022-12-07 RX ORDER — ONDANSETRON 4 MG/1
4 TABLET, ORALLY DISINTEGRATING ORAL EVERY 30 MIN PRN
Status: DISCONTINUED | OUTPATIENT
Start: 2022-12-07 | End: 2022-12-07 | Stop reason: HOSPADM

## 2022-12-07 RX ORDER — ONDANSETRON 2 MG/ML
4 INJECTION INTRAMUSCULAR; INTRAVENOUS EVERY 30 MIN PRN
Status: DISCONTINUED | OUTPATIENT
Start: 2022-12-07 | End: 2022-12-07 | Stop reason: HOSPADM

## 2022-12-07 RX ORDER — KETAMINE HYDROCHLORIDE 10 MG/ML
INJECTION INTRAMUSCULAR; INTRAVENOUS PRN
Status: DISCONTINUED | OUTPATIENT
Start: 2022-12-07 | End: 2022-12-07

## 2022-12-07 RX ORDER — LIDOCAINE HYDROCHLORIDE 20 MG/ML
INJECTION, SOLUTION INFILTRATION; PERINEURAL PRN
Status: DISCONTINUED | OUTPATIENT
Start: 2022-12-07 | End: 2022-12-07

## 2022-12-07 RX ORDER — ONDANSETRON 2 MG/ML
INJECTION INTRAMUSCULAR; INTRAVENOUS PRN
Status: DISCONTINUED | OUTPATIENT
Start: 2022-12-07 | End: 2022-12-07

## 2022-12-07 RX ORDER — SODIUM CHLORIDE, SODIUM LACTATE, POTASSIUM CHLORIDE, CALCIUM CHLORIDE 600; 310; 30; 20 MG/100ML; MG/100ML; MG/100ML; MG/100ML
INJECTION, SOLUTION INTRAVENOUS CONTINUOUS PRN
Status: DISCONTINUED | OUTPATIENT
Start: 2022-12-07 | End: 2022-12-07

## 2022-12-07 RX ORDER — HYDROMORPHONE HCL IN WATER/PF 6 MG/30 ML
0.2 PATIENT CONTROLLED ANALGESIA SYRINGE INTRAVENOUS EVERY 5 MIN PRN
Status: DISCONTINUED | OUTPATIENT
Start: 2022-12-07 | End: 2022-12-07 | Stop reason: HOSPADM

## 2022-12-07 RX ORDER — HYDROMORPHONE HCL IN WATER/PF 6 MG/30 ML
0.4 PATIENT CONTROLLED ANALGESIA SYRINGE INTRAVENOUS EVERY 5 MIN PRN
Status: DISCONTINUED | OUTPATIENT
Start: 2022-12-07 | End: 2022-12-07 | Stop reason: HOSPADM

## 2022-12-07 RX ORDER — KETOROLAC TROMETHAMINE 30 MG/ML
INJECTION, SOLUTION INTRAMUSCULAR; INTRAVENOUS PRN
Status: DISCONTINUED | OUTPATIENT
Start: 2022-12-07 | End: 2022-12-07

## 2022-12-07 RX ORDER — GLYCOPYRROLATE 0.2 MG/ML
INJECTION, SOLUTION INTRAMUSCULAR; INTRAVENOUS PRN
Status: DISCONTINUED | OUTPATIENT
Start: 2022-12-07 | End: 2022-12-07

## 2022-12-07 RX ADMIN — MIDAZOLAM 1 MG: 1 INJECTION INTRAMUSCULAR; INTRAVENOUS at 12:49

## 2022-12-07 RX ADMIN — PHENYLEPHRINE HYDROCHLORIDE 200 MCG: 10 INJECTION INTRAVENOUS at 12:29

## 2022-12-07 RX ADMIN — LIDOCAINE HYDROCHLORIDE 3 ML: 40 INJECTION, SOLUTION RETROBULBAR; TOPICAL at 11:47

## 2022-12-07 RX ADMIN — KETOROLAC TROMETHAMINE 30 MG: 30 INJECTION, SOLUTION INTRAMUSCULAR at 14:21

## 2022-12-07 RX ADMIN — Medication 40 MG: at 12:23

## 2022-12-07 RX ADMIN — Medication 20 MG: at 12:05

## 2022-12-07 RX ADMIN — DEXAMETHASONE SODIUM PHOSPHATE 8 MG: 4 INJECTION, SOLUTION INTRA-ARTICULAR; INTRALESIONAL; INTRAMUSCULAR; INTRAVENOUS; SOFT TISSUE at 13:41

## 2022-12-07 RX ADMIN — GLYCOPYRROLATE 0.2 MG: 0.2 INJECTION, SOLUTION INTRAMUSCULAR; INTRAVENOUS at 12:12

## 2022-12-07 RX ADMIN — PHENYLEPHRINE HYDROCHLORIDE 100 MCG: 10 INJECTION INTRAVENOUS at 12:38

## 2022-12-07 RX ADMIN — SODIUM CHLORIDE, POTASSIUM CHLORIDE, SODIUM LACTATE AND CALCIUM CHLORIDE: 600; 310; 30; 20 INJECTION, SOLUTION INTRAVENOUS at 12:00

## 2022-12-07 RX ADMIN — Medication 10 MG: at 12:14

## 2022-12-07 RX ADMIN — LIDOCAINE HYDROCHLORIDE 80 MG: 20 INJECTION, SOLUTION INFILTRATION; PERINEURAL at 12:14

## 2022-12-07 RX ADMIN — OXYMETAZOLINE HYDROCHLORIDE 2 SPRAY: 0.05 SPRAY NASAL at 12:19

## 2022-12-07 RX ADMIN — MIDAZOLAM 0.5 MG: 1 INJECTION INTRAMUSCULAR; INTRAVENOUS at 12:05

## 2022-12-07 RX ADMIN — SUGAMMADEX 200 MG: 100 INJECTION, SOLUTION INTRAVENOUS at 14:08

## 2022-12-07 RX ADMIN — ONDANSETRON 4 MG: 2 INJECTION INTRAMUSCULAR; INTRAVENOUS at 13:57

## 2022-12-07 RX ADMIN — FENTANYL CITRATE 100 MCG: 50 INJECTION, SOLUTION INTRAMUSCULAR; INTRAVENOUS at 12:14

## 2022-12-07 RX ADMIN — PROPOFOL 100 MG: 10 INJECTION, EMULSION INTRAVENOUS at 12:23

## 2022-12-07 RX ADMIN — MIDAZOLAM 0.5 MG: 1 INJECTION INTRAMUSCULAR; INTRAVENOUS at 12:12

## 2022-12-07 RX ADMIN — PHENYLEPHRINE HYDROCHLORIDE 100 MCG: 10 INJECTION INTRAVENOUS at 12:42

## 2022-12-07 ASSESSMENT — ACTIVITIES OF DAILY LIVING (ADL)
ADLS_ACUITY_SCORE: 43
ADLS_ACUITY_SCORE: 43
ADLS_ACUITY_SCORE: 35

## 2022-12-07 NOTE — ANESTHESIA CARE TRANSFER NOTE
Patient: Ivanna Davis    Procedure: Procedure(s):  Periodontal Therapy, Bilateral Dental Exam, 1 Dental Extractions, Debridement       Diagnosis: Dental caries [K02.9]  Dental infection [K04.7]  Unable to comply with treatment [Z91.199]  Diagnosis Additional Information: No value filed.    Anesthesia Type:   General     Note:    Oropharynx: oropharynx clear of all foreign objects  Level of Consciousness: awake  Oxygen Supplementation: face mask    Independent Airway: airway patency satisfactory and stable  Dentition: dentition unchanged  Vital Signs Stable: post-procedure vital signs reviewed and stable    Patient transferred to: PACU    Handoff Report: Identifed the Patient, Identified the Reponsible Provider, Reviewed the pertinent medical history, Discussed the surgical course, Reviewed Intra-OP anesthesia mangement and issues during anesthesia, Set expectations for post-procedure period and Allowed opportunity for questions and acknowledgement of understanding      Vitals:  Vitals Value Taken Time   /95 12/07/22 1445   Temp     Pulse 72 12/07/22 1445   Resp 10 12/07/22 1445   SpO2 100 % 12/07/22 1445   Vitals shown include unvalidated device data.    Electronically Signed By: Calvin Sebastian MD  December 7, 2022  2:47 PM

## 2022-12-07 NOTE — ANESTHESIA POSTPROCEDURE EVALUATION
Patient: Ivanna Davis    Procedure: Procedure(s):  Periodontal Therapy, Bilateral Dental Exam, 1 Dental Extractions, Debridement       Anesthesia Type:  General    Note:  Disposition: Outpatient   Postop Pain Control: Uneventful            Sign Out: Well controlled pain   PONV: No   Neuro/Psych: Uneventful            Sign Out: Acceptable/Baseline neuro status   Airway/Respiratory: Uneventful            Sign Out: Acceptable/Baseline resp. status   CV/Hemodynamics: Uneventful            Sign Out: Acceptable CV status; No obvious hypovolemia; No obvious fluid overload   Other NRE: NONE   DID A NON-ROUTINE EVENT OCCUR?            Last vitals:  Vitals Value Taken Time   /95 12/07/22 1445   Temp     Pulse 73 12/07/22 1447   Resp 12 12/07/22 1447   SpO2 100 % 12/07/22 1447   Vitals shown include unvalidated device data.    Electronically Signed By: Calvin Sebastian MD  December 7, 2022  2:47 PM

## 2022-12-07 NOTE — ANESTHESIA PREPROCEDURE EVALUATION
Anesthesia Pre-Procedure Evaluation    Patient: Ivanna Davis   MRN: [id number] : 1975        Procedure : Procedure(s):  Biopsies, Frenectomy, Gingivectomy, Alveoloplasty, Periodontal Therapy, Fluoride, Varnish in Mouth, Endodontic Therapy, Bilateral Dental Exam, Radiographs, Dental Restorations, Dental Extractions,  Endo Therapy          Past Medical History:   Diagnosis Date     Generalized convulsive epilepsy (H)      Infantile cerebral palsy (H)      Kyphoscoliosis      Mental retardation       Past Surgical History:   Procedure Laterality Date     BACK SURGERY       EXAM UNDER ANESTHESIA BREAST Bilateral 2019    Procedure: Bilateral Breast Exam Under Anesthesia;  Surgeon: Hortensia Recio MD;  Location: [facility] OR     EXAM UNDER ANESTHESIA PELVIC N/A 2019    Procedure: Breast and Pelvic Exam Under Anesthesia, Pap Smear;  Surgeon: Hortensia Recio MD;  Location: [facility] OR     EXAM UNDER ANESTHESIA, RESTORATIONS, EXTRACTION(S) DENTAL COMPLEX, COMBINED N/A 2019    Procedure: Dental Exam, Periodontal Therapy and Fluoride Treatment;  Surgeon: Ladonna Colorado DDS;  Location: [facility] OR     IR GASTRO JEJUNOSTOMY TUBE PLACEMENT        Allergies   Allergen Reactions     Latex       Social History     Tobacco Use     Smoking status: Never     Smokeless tobacco: Never   Substance Use Topics     Alcohol use: No      Wt Readings from Last 1 Encounters:   19 30.8 kg (67 lb 14.4 oz)        Anesthesia Evaluation   Pt has had prior anesthetic.     History of anesthetic complications  - difficult airway.  Very small mouth opening, history of many nasal fiberoptic intubations .    ROS/MED HX  ENT/Pulmonary:  - neg pulmonary ROS     Neurologic: Comment: Infantile cerebral palsy  Epilepsy on keppra    (+) seizures, Developmental delay,     Cardiovascular:  - neg cardiovascular ROS     METS/Exercise Tolerance:     Hematologic:     (+) anemia,     Musculoskeletal: Comment: Scoliosis      GI/Hepatic:  Comment: Constipation on bowel regimen  Hx of percutaneous GJ tube      Renal/Genitourinary: Comment: Hx of nephrolithiasis, hydronephrosis and nephrostomy tubes    On depo provera      Endo:  - neg endo ROS     Psychiatric/Substance Use:  - neg psychiatric ROS     Infectious Disease: Comment: Hx of candidemia and MRSA infection      Malignancy:  - neg malignancy ROS     Other:            Physical Exam    Airway   unable to assess          Respiratory Devices and Support         Dental           Cardiovascular   cardiovascular exam normal          Pulmonary   pulmonary exam normal                OUTSIDE LABS:  CBC: No results found for: WBC, HGB, HCT, PLT  BMP:   Lab Results   Component Value Date    GLC 80 08/08/2019     COAGS: No results found for: PTT, INR, FIBR  POC: No results found for: BGM, HCG, HCGS  HEPATIC: No results found for: ALBUMIN, PROTTOTAL, ALT, AST, GGT, ALKPHOS, BILITOTAL, BILIDIRECT, CLAUDIA  OTHER: No results found for: PH, LACT, A1C, DAVID, PHOS, MAG, LIPASE, AMYLASE, TSH, T4, T3, CRP, SED    Anesthesia Plan    ASA Status:  3      Anesthesia Type: General.     - Airway: ETT   Induction: Intravenous.   Maintenance: Balanced.   Techniques and Equipment:     - Airway: Nasal SUSANNAH, Fiberoptic Bronchoscope, Awake FOI/VL     - Lines/Monitors: BIS     Consents    Anesthesia Plan(s) and associated risks, benefits, and realistic alternatives discussed. Questions answered and patient/representative(s) expressed understanding.     - Discussed: Risks, Benefits and Alternatives for BOTH SEDATION and the PROCEDURE were discussed     - Discussed with:  Parent (Mother and/or Father)      - Extended Intubation/Ventilatory Support Discussed: No.      - Patient is DNR/DNI Status: No    Use of blood products discussed: No .     Postoperative Care    Pain management: Multi-modal analgesia.   PONV prophylaxis: Ondansetron (or other 5HT-3), Dexamethasone or Solumedrol     Comments:                Juvencio Bernal  MD

## 2022-12-07 NOTE — OR NURSING
Per Nuvia from Infectious Disease, pt no longer mees criteria for isolation pre-cautions.  Nuvia will update the chart status.

## 2022-12-07 NOTE — BRIEF OP NOTE
[facility]    Brief Operative Note    Pre-operative diagnosis: Dental caries [K02.9]  Dental infection [K04.7]  Unable to comply with treatment [Z91.199]  Post-operative diagnosis;  Chronic gingivitis and periodontal disease, severely limited opening    Procedure: Procedure(s):  Periodontal Therapy, Bilateral Dental Exam, 1 Dental Extractions, Debridement  Surgeon: Surgeon(s) and Role:     * Viki Ruiz DDS - Primary     * Crys Figueroa MD - Resident - Assisting     * Elizabeth Onofre MD - Resident - Assisting  Anesthesia: General   Estimated Blood Loss: 4ml    Drains: None  Specimens: * No specimens in log *  Findings:   None.  Complications: None.  Implants: * No implants in log *

## 2022-12-07 NOTE — ANESTHESIA PROCEDURE NOTES
Airway       Patient location during procedure: OR       Procedure Start/Stop Times: 12/7/2022 12:22 PM  Staff -        Anesthesiologist:  Calvin Sebastian MD       Resident/Fellow: Juvencio Bernal MD       Performed By: resident  Consent for Airway        Urgency: elective  Indications and Patient Condition       Indications for airway management: koffi-procedural       Induction type:awake      Final Airway Details       Final airway type: endotracheal airway       Successful airway: Nasal and SUSANNAH  Endotracheal Airway Details        ETT size (mm): 6.0       Cuffed: yes       Successful intubation technique: flexible bronchoscopy       Grade View of Cords: 1       Position: Center (right nare)       Measured from: nares       Secured at (cm): 25       Bite block used: None    Post intubation assessment        Placement verified by: capnometry, equal breath sounds and chest rise        Number of attempts at approach: 1       Number of other approaches attempted: 0       Secured with: plastic tape       Ease of procedure: easy       Dentition: Intact and Unchanged    Medication(s) Administered   Medication Administration Time: 12/7/2022 12:22 PM

## 2022-12-07 NOTE — ANESTHESIA CARE TRANSFER NOTE
Patient: Ivanna Davis    Procedure: Procedure(s):  Periodontal Therapy, Bilateral Dental Exam, 1 Dental Extractions, Debridement       Diagnosis: Dental caries [K02.9]  Dental infection [K04.7]  Unable to comply with treatment [Z91.199]  Diagnosis Additional Information: No value filed.    Anesthesia Type:   General     Note:    Oropharynx: oropharynx clear of all foreign objects and spontaneously breathing  Level of Consciousness: drowsy  Oxygen Supplementation: face mask  Level of Supplemental Oxygen (L/min / FiO2): 6  Independent Airway: airway patency satisfactory and stable  Dentition: S/P dental procedure  Vital Signs Stable: post-procedure vital signs reviewed and stable  Report to RN Given: handoff report given  Patient transferred to: PACU    Handoff Report: Identifed the Patient, Identified the Reponsible Provider, Reviewed the pertinent medical history, Discussed the surgical course, Reviewed Intra-OP anesthesia mangement and issues during anesthesia, Set expectations for post-procedure period and Allowed opportunity for questions and acknowledgement of understanding      Vitals:  Vitals Value Taken Time   /89 12/07/22 1432   Temp 36.3    Pulse 76 12/07/22 1444   Resp 13 12/07/22 1444   SpO2 100 % 12/07/22 1444   Vitals shown include unvalidated device data.    Electronically Signed By: Juvencio Bernal MD  December 7, 2022  2:46 PM

## 2022-12-12 NOTE — BRIEF OP NOTE
It was deemed necessary for this patient to be seen in the hospital operating room because of inability to treat in a traditional dental clinic setting due to his unstable medical condition. Pedro Davis is a 45 year old female with past medical history of moderate MR, cerebral palsy, quadrapelgia, very limited opening, previous VRE             Allergies: No known allergies      Consent: Risks complications including but not limited to post-operative pain, swelling, bleeding, infection, temporary/permanent paresthesia/anesthesia of CN V3, lingual nerve, failure to resolve chief complaint. Patients' guardian agreed to procedure as written, and patient signed consent.         Names:     The attending physicians were Viki Ruiz DDS.      The first dental resident was Elizabeth Onofre DMD     The second dental resident was Crys Figueroa DMD        The anesthesiologist was Calvin Sebastian MD.      The CRNA was Manda Severude  (APRN CRNA).         Summary:     Under general anesthesia, the following operations were performed in the mouth: Dental examination prophy, extraction of #5    Diagnosis:     The preoperative diagnosis was dental caries.     The postoperative diagnosis was chronic gingivitis with severe periodontal disease.  Very heavy calculus noted.        General Anesthesia Start:     The patient was brought into the operating room and draped in the usual customary [facility] fashion. Following the time-out procedures, the patient was placed under General Anesthesia Care via oropharanxy. A moist throat pack x 2 was placed at 1300 hrs. Double throat pack used due to limited opening and very limited access to see throat.       Observations:     Clinical examination revealed grade 3 mobility for tooth #5, very heavy calculus.  Opening limited to 5 tongue blades, approx 10 mm.  Unable to capture radiographs or probe due to limited opening.  Due to limited opening very difficult  access for treatment. Unable to use radiographs to diagnose pathology.  Used clinical observations to deem treatment.  Difficult to use suction to deem all particles removed in oral cavity as limited view.   Aspiration a concern unless opening can be improved.  Consider igor-tox prior to dental to improve opening, consider a dental CT at next dental or try Pano in clinic for diagnosis prior to next dental     Local Anesthesia:     Procedure:     The following procedures were performed:  Periodontal therapy with heavy calculus removal using cavitron and hand instruments.  Finger polished with mint paste and flossed anterior teeth as only had access to do so.     Simple extractions using fingers for #5.  Prophy      Digital compression performed.      Throat packed removed at 1356 hrs.. The oropharynx was inspected and found to be clear. Estimated blood loss was 3 ml. The attending doctor, Dr. Ruiz was present for the entire procedure.

## 2024-03-13 NOTE — TELEPHONE ENCOUNTER
FUTURE VISIT INFORMATION      SURGERY INFORMATION:  Date: TBD    Appt per Neisha RN care coordinator at group home pt is at // Dental procedure by Ananya Amador procedure not yet scheduled procedure will be at [facility] // cardiac or pulmonary issues- none known     RECORDS REQUESTED FROM:       Primary Care Provider: Pretty Lancaster     Pertinent Medical History:

## 2024-03-25 NOTE — TELEPHONE ENCOUNTER
FUTURE VISIT INFORMATION      SURGERY INFORMATION:  Date: 4/3/2024  Location: [facility] OR   Surgeon:  Yael Heard DDS   Anesthesia Type:  General   Procedure: Bilateral dental exam, Dental radiograph, dental restorations, pulpotomy, root canal therapy, frenectomy, gingivectomy, Alveoloplasty, periodontal therapy, fluoride, varnish in the mouth, dental extractions       RECORDS REQUESTED FROM:       Primary Care Provider: Pretty Lancaster     Pertinent Medical History:

## 2024-03-26 ENCOUNTER — PRE VISIT (OUTPATIENT)
Dept: SURGERY | Facility: CLINIC | Age: 49
End: 2024-03-26

## 2024-04-03 ENCOUNTER — OFFICE VISIT (OUTPATIENT)
Dept: SURGERY | Facility: CLINIC | Age: 49
End: 2024-04-03
Payer: MEDICARE

## 2024-04-03 ENCOUNTER — ANESTHESIA EVENT (OUTPATIENT)
Dept: SURGERY | Facility: CLINIC | Age: 49
End: 2024-04-03
Payer: MEDICARE

## 2024-04-03 ENCOUNTER — PRE VISIT (OUTPATIENT)
Dept: SURGERY | Facility: CLINIC | Age: 49
End: 2024-04-03

## 2024-04-03 VITALS — HEIGHT: 58 IN | WEIGHT: 87.9 LBS | BODY MASS INDEX: 18.45 KG/M2

## 2024-04-03 DIAGNOSIS — Z01.818 PREOP EXAMINATION: Primary | ICD-10-CM

## 2024-04-03 DIAGNOSIS — K04.7 DENTAL INFECTION: ICD-10-CM

## 2024-04-03 PROCEDURE — 99204 OFFICE O/P NEW MOD 45 MIN: CPT | Performed by: NURSE PRACTITIONER

## 2024-04-03 RX ORDER — PSEUDOEPHEDRINE HCL 30 MG
TABLET ORAL EVERY 6 HOURS PRN
COMMUNITY
Start: 2022-12-30

## 2024-04-03 RX ORDER — LORAZEPAM 2 MG/ML
CONCENTRATE ORAL
COMMUNITY
Start: 2023-09-08

## 2024-04-03 ASSESSMENT — ENCOUNTER SYMPTOMS: SEIZURES: 1

## 2024-04-03 ASSESSMENT — LIFESTYLE VARIABLES: TOBACCO_USE: 0

## 2024-04-03 NOTE — H&P
Pre-Operative H & P     CC:  Preoperative exam to assess for increased cardiopulmonary risk while undergoing surgery and anesthesia.    Date of Encounter: 4/3/2024  Primary Care Physician:  Pretty Lancaster     Reason for visit:   Encounter Diagnoses   Name Primary?    Preop examination Yes    Dental infection        HPI  Ivanna DURAN Davis is a 49 year old female who presents for pre-operative H & P in preparation for  Procedure Information       Case: [id number] Date/Time: 04/11/24 1050    Procedure: Bilateral dental exam, Dental radiograph, dental restorations, pulpotomy, root canal therapy, frenectomy, gingivectomy, Alveoloplasty, periodontal therapy, fluoride, varnish in the mouth, dental extractions (Bilateral: Mouth)    Anesthesia type: General    Diagnosis: Dental infection [K04.7]    Pre-op diagnosis: Dental infection [K04.7]    Location:  OR 10 / [facility]    Providers: Yael Heard DDS            The patient presents to the PAC in person today with [facility] nursing assistant, Jordonbonnie Umanzor, in preparation for the above scheduled procedure with comorbid conditions including cerebral palsy, spastic quadriplegia, severe developmentally disabled, seizure disorder, anemia, GJ tube for nutritional support, kyphoscoliosis, and KNOWN DIFFICULT INTUBATION.     The patient has a dental infection and given her history of cerebral palsy with severe developmentally delayed, the above procedure has been scheduled under anesthesia as done in the past for previous dental work.       Patient is non-verbal.  Mr. Umanzor had very limited medical information so this provider contacted Ms. Lorena Sher LPN, at [facility] to complete interview.     History is obtained from the patient and chart review    Hx of abnormal bleeding or anti-platelet use: denies     Menstrual history: No LMP recorded (lmp unknown). (Menstrual status: Birth Control).: patient on Depo-provera Q 3 months.     Past Medical History  Past  Medical History:   Diagnosis Date    Generalized convulsive epilepsy (H)     Infantile cerebral palsy (H)     Kyphoscoliosis     Mental retardation        Past Surgical History  Past Surgical History:   Procedure Laterality Date    BACK SURGERY      EXAM UNDER ANESTHESIA BREAST Bilateral 8/8/2019    Procedure: Bilateral Breast Exam Under Anesthesia;  Surgeon: Hortensia Recio MD;  [facility]    EXAM UNDER ANESTHESIA PELVIC N/A 8/8/2019    Procedure: Breast and Pelvic Exam Under Anesthesia, Pap Smear;  Surgeon: Hortensia Recio MD;  [facility]    EXAM UNDER ANESTHESIA, RESTORATIONS, EXTRACTION(S) DENTAL COMPLEX, COMBINED N/A 8/8/2019    Procedure: Dental Exam, Periodontal Therapy and Fluoride Treatment;  Surgeon: Ladonna Colorado DDS;  [facility]    EXAM UNDER ANESTHESIA, RESTORATIONS, EXTRACTION(S) DENTAL COMPLEX, COMBINED N/A 12/7/2022    Procedure: Periodontal Therapy, Bilateral Dental Exam, 1 Dental Extractions, Debridement;  Surgeon: Viki Ruiz DDS;  [facility]    IR GASTRO JEJUNOSTOMY TUBE PLACEMENT         Prior to Admission Medications  PLEASE NOTE, MEDICATION [facility]ONC[facility]   Current Outpatient Medications   Medication Sig Dispense Refill    acetaminophen (TYLENOL) 325 MG tablet Take 325-650 mg by mouth every 6 hours as needed for mild pain      baclofen (LIORESAL) 10 MG tablet Take 10 mg by mouth 3 times daily      bisacodyl (DULCOLAX) 10 MG suppository Place 10 mg rectally daily as needed for constipation      chlorhexidine (PERIDEX) 0.12 % solution Swab or brush on to gums twice a day for 2 weeks. (Patient taking differently: Take 15 mLs by mouth 2 times daily Swab or brush on to gums twice a day for 2 weeks.) 473 mL 0    ciprofloxacin-dexamethasone (CIPRODEX) 0.3-0.1 % otic suspension 4 drops as needed      ergocalciferol (DRISDOL-D2) 8000 units/mL (200 mcg/mL) drops Take 8,000 Units by mouth once a week Fridays       ferrous sulfate (FEROSUL) 325 (65 Fe) MG tablet Take 325 mg by mouth 2 times daily Via g-tube      guaiFENesin (ROBITUSSIN) 100 MG/5ML SYRP Take 10 mLs by mouth every 4 hours as needed for cough      levETIRAcetam (KEPPRA) 100 MG/ML solution Take 20 mLs by mouth 2 times daily      LORazepam (ATIVAN) 2 MG/ML (HIGH CONC) oral solution TAKE 0.5ML (1MG) BUCALLY AS NEEDED FOR SEIZURE LASTING LONGER THAN 3 MINUTES. MAY REPEAT ONE DOSE AFTER 15 MINUTES IF NEEDED. *4 TOTAL FILLS*      medroxyPROGESTERone (DEPO-PROVERA) 150 MG/ML IM injection Inject 150 mg into the muscle every 3 months      Nutritional Supplements (ISOSOURCE 1.5 DAVID PO) Take by mouth 3 times daily      nystatin (MYCOSTATIN) 899375 UNIT/GM external cream Apply topically At Bedtime      polyethylene glycol (MIRALAX/GLYCOLAX) packet Take 1 packet by mouth 2 times daily      primidone (MYSOLINE) 50 MG tablet Take 300 mg by mouth 2 times daily      primidone (MYSOLINE) 50 MG tablet Take 300 mg by mouth 2 times daily      pseudoePHEDrine (SUDOGEST) 30 MG tablet every 6 hours as needed      Skin Protectants, Misc. (CALAZIME SKIN PROTECTANT) PSTE Externally apply topically 4 times daily as needed      SODIUM PHOSPHATES RE Place rectally as needed         Allergies  Allergies   Allergen Reactions    Latex        Social History  Social History     Socioeconomic History    Marital status: Single     Spouse name: Not on file    Number of children: Not on file    Years of education: Not on file    Highest education level: Not on file   Occupational History    Not on file   Tobacco Use    Smoking status: Never    Smokeless tobacco: Never   Substance and Sexual Activity    Alcohol use: No    Drug use: No    Sexual activity: Not on file   Other Topics Concern    Not on file   Social History Narrative    Not on file     Social Determinants of Health     Financial Resource Strain: Not on file   Food Insecurity: Not on file   Transportation Needs: Not on file   Physical  "Activity: Not on file   Stress: Not on file   Social Connections: Not on file   Interpersonal Safety: Not on file   Housing Stability: Not on file       Family History  No family history on file.    Review of Systems  The complete review of systems is negative other than noted in the HPI or here.   Anesthesia Evaluation   Pt has had prior anesthetic. Type: General.    History of anesthetic complications  - difficult airway.      ROS/MED HX  ENT/Pulmonary:    (-) tobacco use, asthma and MANUELA risk factors   Neurologic: Comment: Cerebral Palsy and severely developmentally delayed.  Non verbal and non-weight bearing/wheelchair bound. Full transfer with Carina lift.     (+)       seizures, last seizure: unsure,                        Cardiovascular:  - neg cardiovascular ROS  (-) taking anticoagulants/antiplatelets   METS/Exercise Tolerance:  Comment: METS<1.    Hematologic:     (+)      anemia,       (-) history of blood clots and history of blood transfusion   Musculoskeletal: Comment: Evaluated in urgent care on 3/25/24 for contusion with associated soft tissue swelling potentially from a mild impact during transfer or resting her elbow on her wheelchair consistently.      GI/Hepatic: Comment: Nutrition via G-J tube. NPO.    (-) liver disease   Renal/Genitourinary:  - neg Renal ROS     Endo:  - neg endo ROS     Psychiatric/Substance Use:  - neg psychiatric ROS     Infectious Disease:  - neg infectious disease ROS     Malignancy:  - neg malignancy ROS     Other:            Ht 1.473 m (4' 10\")   Wt 39.9 kg (87 lb 14.4 oz)   LMP  (LMP Unknown)   Breastfeeding No   BMI 18.37 kg/m      Physical Exam >>Limited exam as patinet with significant extremity contractures and wheelchair bound.  Unable to follow commands>>unable to cooperate.   Constitutional: no apparent distress, teeth grinding and drooling  Eyes: Pupils equal, round and reactive to light, extra ocular muscles grossly intact.   HENT: Normocephalic, oral pharynx " unable to evaluate as patient not able to cooperate.   Respiratory: Clear to auscultation bilaterally, no crackles or wheezing.  Cardiovascular: Regular rate and rhythm, normal S1 and S2, and no murmur noted.  Carotids +2, no bruits.   GI: Normal bowel sounds, soft, non-distended, non-tender, no masses palpated, no hepatosplenomegaly.  Surgical scars: well healed.  G-J tube intact.   Lymph/Hematologic: No cervical lymphadenopathy and no supraclavicular lymphadenopathy.  Skin: Warm and dry. .   Musculoskeletal: wheel chair bound with significant contractures of extremities.    Neurologic: Awake and alert. Non verbal and non ambulatory.   Neuropsychiatric: Calm, unable to cooperate to do limited cognitive function.     Prior Labs/Diagnostic Studies   All labs and imaging personally reviewed   Primidone and Metabolite  Order: [id number]  Component  Ref Range & Units 6 mo ago   Primidone Level  5.0 - 12.0 ug/mL 9.2   Comment: INTERPRETIVE INFORMATION: Primidone and Metabolite      Primidone concentrations greater than 15 ug/mL in conjunction  with therapeutic levels of Phenobarbital may be associated with  toxicity.  Performed At: [facility]  [place] [address]  Medical Director: ANDRÉS BUTLER DO  CLIA Number: [id number]   Phenobarbital, Serum  15.0 - 40.0 ug/mL 24.5   Comment: REFERENCE INTERVAL: Phenobarbital      Access complete set of age- and/or gender-specific reference  intervals for this test in the [facility] Laboratory Test Directory  ([email or web address]).  Performed At: [facility] [place] [address]  Medical Director: ANDRÉS BUTLER DO  CLIA Number: [id number]   [facility] LABORATORIES     Specimen Collected: 10/06/23  8:23 AM       Liver Panel(Hepatic Function Panel)  Order: [id number]  Component  Ref Range & Units 7 mo ago   Alkaline Phosphatase  40 - 150 U/L 131    Bilirubin, Total  0.2 - 1.2 mg/dL 0.2   Bilirubin, Direct  0.0 - 0.5 mg/dL 0.1   AST (SGOT)  10 - 40 U/L 31   ALT (SGPT)  <=55 U/L 41   Protein, Total  6.4 - 8.3 g/dL 7.4   Albumin  3.5 - 5.0 g/dL 3.7   Resulting Agency [facility]       Specimen Collected: 08/29/23  8:48 AM     tains abnormal data Basic Metabolic Panel  Order: [id number]  Component  Ref Range & Units 7 mo ago Resulting Agency   Sodium  136 - 145 mmol/L 138 [facility]   Potassium  3.5 - 5.1 mmol/L 5.0 [facility]   Chloride  98 - 109 mmol/L 102 [facility]   CO2  20 - 29 mmol/L 28 [facility]   Anion Gap  7 - 16 mmol/L 8 [facility]   Calcium  8.4 - 10.4 mg/dL 9.5 [facility]   BUN  7 - 26 mg/dL 15 [facility]   Creatinine  0.55 - 1.02 mg/dL 0.42 Low  [facility]   Glucose  70 - 100 mg/dL 93 [facility]   Comment: The given reference range is for the fasting state. Non-fasting reference range for glucose is 70 - 180 mg/dL.   GFR, Estimated  >60 mL/min/1.73m2 >60 [facility]   Hours Fasting  8 - 12 Hours 12.0 [facility]     Specimen Collected: 08/29/23  8:48 AM    Performed by: [facility]      Complete Blood Count-W/Diff  Order: [id number]  Component  Ref Range & Units 7 mo ago   WBC  3.5 - 10.5 x10(9)/L 6.0   RBC  3.90 - 5.03 x10(12)/L 4.98   Hemoglobin  12.0 - 15.5 g/dL 15.4   HCT  34.9 - 44.5 % 46.0 High    MCV  80.0 - 100.0 fL 92.4   MCH  27.6 - 33.3 pg 30.9   MCHC  31.5 - 35.2 g/dL 33.5   RDW  11.9 - 15.5 % 11.8 Low    Platelets  150 - 450 x10(9)/L 253   Automated NRBC  <=0 /100 WBC 0   Neutrophil Absolute  1.7 - 7.0 10(9)/L 3.3   Lymphocyte Absolute  1.0 - 4.8 10(9)/L 1.8   Monocyte Absolute  0.2 - 0.9 10(9)/L 0.4   Eosinophil Absolute  0.0 - 0.5 10(9)/L 0.5   Basophil Absolute  0.0 - 0.3 10(9)/L 0.1   Immature Granulocyte %  0.0 - 0.5 % 0.2   Resulting Agency [facility]     Specimen Collected: 08/29/23  8:48 AM      The patient's records and results personally reviewed by this provider.     Outside records reviewed from: Care Everywhere    LAB/DIAGNOSTIC STUDIES TODAY:  not indicated    Assessment    Ivanna Davis is a 49 year old female seen as a PAC referral for risk assessment and optimization for anesthesia.    Plan/Recommendations  Pt will be optimized for the proposed procedure.  See below for details on the assessment, risk, and preoperative recommendations    NEUROLOGY  - Cerebral palsy, spastic quadriplegia and severe developmentally disabled   ~ Non-weight bearing/full transfer with Carina lift.    ~ Fall precautions  ~ Wheel chair bound  ~ Non-verbal and NPO   ~ Baclofen for spasms   ~ Incontinent of bowel and bladder>>Depends undergarment    - Seizure disorder   ~ Seizure present with drooling and turning of the head per Ms. Christos LPN at [facility]. Typically occur ~1X/week with most recent seizure on 3/21/2024.  Keppra and primidone for maintenance with lorazepam for break through seizures.  Followed by Dr. Jesika Cox.     - Chronic Pain   ~ Acetaminophen      -Post Op delirium risk factors:  History of pre-existing cognitive dysfunction    ENT  - Dental caries   ~ above procedure schedule.    - Patient has previous history of complicated airway.   Patient has needed fiberoptic in the past.   Per Record review:  08/08/19; 1220; Mask Ventilation:  (not attempted); Ease of Intubation: Easy; Airway Size: 6;  Cuffed;  Nasal, SUSANNAH;  Blade Type: Fiberoptic Bronchoscope;  Place by: Shannan;  Insertion Attempts: 1;  Secured at (cm)to lip: 26 cm (at nare);  Breath Sounds: Equal, clear and bilateral;  End Tidal CO2: Present;  Dentition: Unchanged;  Grade View of Cords: 1;  Airway Adjuncts:  Fiber optic   Mallampati: Unable to assess  TM: > 3    CARDIAC  - No history of CAD, Hypertension, and Afib    - METS (Metabolic Equivalents)  Patient CANNOT perform 4 METS exercise without symptoms            Total Score:  "1    Functional Capacity: Unable to complete 4 METS      - RCRI-Very low risk: Class 1 0.4% complication rate            Total Score: 0        PULMONARY  - MANUELA Low Risk            Total Score: 0      - Denies asthma or inhaler use    - Tobacco History    History   Smoking Status    Never   Smokeless Tobacco    Never       GI  - Patient is NPO.  Nutritional support via GJ tube   ~ Isosource 5X/day ( 7 am, 11 am, 3 pm, 7 pm and 11 pm).    - Denies h/o GERD    - PONV Medium Risk  Total Score: 2           1 AN PONV: Pt is Female    1 AN PONV: Patient is not a current smoker        /RENAL  - Baseline Creatinine  see above     ENDOCRINE    - BMI: Estimated body mass index is 18.37 kg/m  as calculated from the following:    Height as of this encounter: 1.473 m (4' 10\").    Weight as of this encounter: 39.9 kg (87 lb 14.4 oz).  Underweight (BMI < 18.5)  - No history of Diabetes Mellitus    HEME  - VTE Low Risk 0.26%            Total Score: 0      - No history of abnormal bleeding or antiplatelet use.    - Iron deficient anemia on iron replacement    ACCESS  - For PIV placement, LUE more contracted that RUE.      Different anesthesia methods/types have been discussed with the patient, but they are aware that the final plan will be decided by the assigned anesthesia provider on the date of service.    The patient is optimized for their procedure. AVS with information on surgery time/arrival time, meds and NPO status given by nursing staff. No further diagnostic testing indicated.      On the day of service:     Prep time: 20 minutes  Visit time: 13 minutes  Documentation time: 20 minutes  ------------------------------------------  Total time: 53 minutes      ZULEYMA Blackwood CNP  Preoperative Assessment Center  [facility]  Clinic and Surgery Center  Phone: [phone]  Fax: [phone]    " Quality 110: Preventive Care And Screening: Influenza Immunization: Influenza immunization was not ordered or administered, reason not given Detail Level: Detailed

## 2024-04-03 NOTE — PATIENT INSTRUCTIONS
Preparing for Your Surgery      Name:  Ivanna Davis   MRN:  [id number]   :  1975   Today's Date:  4/3/2024       Arriving for surgery:  Surgery date:  24  Arrival time:  8:50 am  Surgery time: 10:50 am    Please come to:     Please come to:      [facility] [address]  [facility]   [address] [place]  [address]  The Green Ramp for patients and visitors is located beneath the [facility]. The parking facility entrance is at the intersection of [address] [place] [address] and [address]. Patients and visitors who self-park will receive the reduced hospital parking rate (no ticket validation needed).  [employer] parking, located at the [facility] main entrance on [address] [place] [address], is available Monday - Friday from 7 am to 3:30 pm.  Discounted parking pass options can be purchased from  attendants during business hours.  -Check in at the security desk in the [facility] ([facility]) Lobby. They will direct you to the correct elevators.  -Proceed to the 3rd floor, check in at the Adult Surgery Waiting Lounge. [phone]  If you are in need of directions, a wheelchair or escort please stop at the Information Desk in the lobby.  Inform the information person that you are here for surgery; a wheelchair and escort to [address] will be provided.   An escort to the Adult Surgery Waiting Lounge will be provided.    What can I eat or drink?  -  You may eat and drink normally up to 8 hours prior to arrival time. (Until 12:50 am on 24) includes Isosource     -  You may have clear liquids until 2 hours prior to arrival time. (Until 6:50 am on 24) Includes flushes- unless prn medication is needed after this time    Examples of clear liquids:  Water  Clear broth  Juices (apple, white grape, white cranberry  and cider) without pulp  Noncarbonated,  powder based beverages  (lemonade and Tru-Aid)  Sodas (Sprite, 7-Up, ginger ale and seltzer)  Coffee or tea (without milk or cream)  Gatorade    -  No Alcohol or cannabis products for at least 24 hours before surgery.     Which medicines can I take?    Hold Ibuprofen (Advil, Motrin) for 1 day(s) before surgery--unless otherwise directed by surgeon.  Hold Naproxen (Aleve) for 4 days before surgery.    -  DO NOT take these medications the day of surgery:   Ferrous sulfate   No suppository morning of surgery   Miralax    Guaifenesin (Robitussin)   Pseudoephedrine (Sudafed)         -  PLEASE TAKE these medications the day of surgery:   Acetaminophen (tylenol) as needed   Baclofen    Chlorhexidine (Peridex)-IF done before 6:50 am OR she does not swallow it   Levetiracetam (Keppra)   Lorazepam (ativan) as needed   Primidone (Mysoline)           How do I prepare myself?  - Please take 2 showers (one the night prior to surgery and one the morning of surgery) using Scrubcare or Hibiclens soap.    Use this soap only from the neck to your toes.     Leave the soap on your skin for one minute--then rinse thoroughly.      You may use your own shampoo and conditioner. No other hair products.   - Please remove all jewelry and body piercings.  - No lotions, deodorants or fragrance.  - No makeup or fingernail polish.   - Bring your ID and insurance card.    -If you use a CPAP machine, please bring the CPAP machine, tubing, and mask to hospital.    -If you have a Deep Brain Stimulator, Spinal Cord Stimulator, or any Neuro Stimulator device---you must bring the remote control to the hospital.      ALL PATIENTS GOING HOME THE SAME DAY OF SURGERY ARE REQUIRED TO HAVE A RESPONSIBLE ADULT TO DRIVE AND BE IN ATTENDANCE WITH THEM FOR 24 HOURS FOLLOWING SURGERY.    Covid testing policy as of 12/06/2022  Your surgeon will notify and schedule you for a COVID test if one is needed before surgery--please direct any questions or COVID symptoms to  your surgeon      Questions or Concerns:    - For any questions regarding the day of surgery or your hospital stay, please contact the Pre Admission Nursing Office at [phone].       - If you have health changes between today and your surgery, please call your surgeon.       - For questions after surgery, please call your surgeons office.           Current Visitor Guidelines    You may have 2 visitors in the pre op area.    Visiting hours: 8 a.m. to 8:30 p.m.    Patients confirmed or suspected to have symptoms of COVID 19 or flu:     No visitors allowed for adult patients.   Children (under age 18) can have 1 named visitor.     People who are sick or showing symptoms of COVID 19 or flu:    Are not allowed to visit patients--we can only make exceptions in special situations.       Please follow these guidelines for your visit:          Please maintain social distance          Masking is optional--however at times you may be asked to wear a mask for the safety of yourself and others     Clean your hands with alcohol hand . Do this when you arrive at and leave the building and patient room,    And again after you touch your mask or anything in the room.     Go directly to and from the room you are visiting.     Stay in the patient s room during your visit. Limit going to other places in the hospital as much as possible     Leave bags and jackets at home or in the car.     For everyone s health, please don t come and go during your visit. That includes for smoking   during your visit.

## 2024-04-10 NOTE — ANESTHESIA PREPROCEDURE EVALUATION
Pre-Operative H & P     CC:  Preoperative exam to assess for increased cardiopulmonary risk while undergoing surgery and anesthesia.    Date of Encounter: 4/3/2024  Primary Care Physician:  rPetty Lancaster     Reason for visit:   No diagnosis found.      BE Davis is a 49 year old female who presents for pre-operative H & P in preparation for  Procedure Information       Case: [id number] Date/Time: 04/11/24 1100    Procedure: Bilateral dental exam, Dental radiograph, dental restorations, pulpotomy, root canal therapy, frenectomy, gingivectomy, Alveoloplasty, periodontal therapy, fluoride, varnish in the mouth, dental extractions (Bilateral: Mouth)    Anesthesia type: General    Diagnosis: Dental infection [K04.7]    Pre-op diagnosis: Dental infection [K04.7]    Location:  OR 10 / [facility] OR    Providers: Yael Heard DDS            The patient presents to the PAC in person today with [facility] nursing assistant, Willian Erna, in preparation for the above scheduled procedure with comorbid conditions including cerebral palsy, spastic quadriplegia, severe developmentally disabled, seizure disorder, anemia, GJ tube for nutritional support, kyphoscoliosis, and KNOWN DIFFICULT INTUBATION.     The patient has a dental infection and given her history of cerebral palsy with severe developmentally delayed, the above procedure has been scheduled under anesthesia as done in the past for previous dental work.       Patient is non-verbal.  Mr. Umanzor had very limited medical information so this provider contacted Ms. Lorena Sher LPN, at [facility] to complete interview.     History is obtained from the patient and chart review    Hx of abnormal bleeding or anti-platelet use: denies     Menstrual history: No LMP recorded. (Menstrual status: Birth Control).: patient on Depo-provera Q 3 months.     Past Medical History  Past Medical History:   Diagnosis Date     Generalized convulsive epilepsy (H)       Infantile cerebral palsy (H)      Kyphoscoliosis      Mental retardation        Past Surgical History  Past Surgical History:   Procedure Laterality Date     BACK SURGERY       EXAM UNDER ANESTHESIA BREAST Bilateral 8/8/2019    Procedure: Bilateral Breast Exam Under Anesthesia;  Surgeon: Hortensia Recio MD;  [facility]     EXAM UNDER ANESTHESIA PELVIC N/A 8/8/2019    Procedure: Breast and Pelvic Exam Under Anesthesia, Pap Smear;  Surgeon: Hortensia Recio MD;  [facility]     EXAM UNDER ANESTHESIA, RESTORATIONS, EXTRACTION(S) DENTAL COMPLEX, COMBINED N/A 8/8/2019    Procedure: Dental Exam, Periodontal Therapy and Fluoride Treatment;  Surgeon: Ladonna Colorado DDS;  [facility]     EXAM UNDER ANESTHESIA, RESTORATIONS, EXTRACTION(S) DENTAL COMPLEX, COMBINED N/A 12/7/2022    Procedure: Periodontal Therapy, Bilateral Dental Exam, 1 Dental Extractions, Debridement;  Surgeon: Viki Ruiz DDS;  [facility]     IR GASTRO JEJUNOSTOMY TUBE PLACEMENT         Prior to Admission Medications  PLEASE NOTE, MEDICATION RECONCILIATION DONE WITH NURSING STAFF AT [facility].   Current Outpatient Medications   Medication Sig Dispense Refill     acetaminophen (TYLENOL) 325 MG tablet Take 325-650 mg by mouth every 6 hours as needed for mild pain       baclofen (LIORESAL) 10 MG tablet Take 10 mg by mouth 3 times daily       bisacodyl (DULCOLAX) 10 MG suppository Place 10 mg rectally daily as needed for constipation       chlorhexidine (PERIDEX) 0.12 % solution Swab or brush on to gums twice a day for 2 weeks. (Patient taking differently: Take 15 mLs by mouth 2 times daily Swab or brush on to gums twice a day for 2 weeks.) 473 mL 0     ciprofloxacin-dexamethasone (CIPRODEX) 0.3-0.1 % otic suspension 4 drops as needed       ergocalciferol (DRISDOL-D2) 8000 units/mL (200 mcg/mL) drops Take 8,000 Units by mouth once a week Fridays       ferrous sulfate (FEROSUL) 325 (65 Fe) MG tablet Take 325  mg by mouth 2 times daily Via g-tube       guaiFENesin (ROBITUSSIN) 100 MG/5ML SYRP Take 10 mLs by mouth every 4 hours as needed for cough       levETIRAcetam (KEPPRA) 100 MG/ML solution Take 20 mLs by mouth 2 times daily       LORazepam (ATIVAN) 2 MG/ML (HIGH CONC) oral solution TAKE 0.5ML (1MG) BUCALLY AS NEEDED FOR SEIZURE LASTING LONGER THAN 3 MINUTES. MAY REPEAT ONE DOSE AFTER 15 MINUTES IF NEEDED. *4 TOTAL FILLS*       medroxyPROGESTERone (DEPO-PROVERA) 150 MG/ML IM injection Inject 150 mg into the muscle every 3 months       Nutritional Supplements (ISOSOURCE 1.5 DAVID PO) Take by mouth 3 times daily       nystatin (MYCOSTATIN) 685081 UNIT/GM external cream Apply topically At Bedtime       polyethylene glycol (MIRALAX/GLYCOLAX) packet Take 1 packet by mouth 2 times daily       primidone (MYSOLINE) 50 MG tablet Take 300 mg by mouth 2 times daily       primidone (MYSOLINE) 50 MG tablet Take 300 mg by mouth 2 times daily       pseudoePHEDrine (SUDOGEST) 30 MG tablet every 6 hours as needed       Skin Protectants, Misc. (CALAZIME SKIN PROTECTANT) PSTE Externally apply topically 4 times daily as needed       SODIUM PHOSPHATES RE Place rectally as needed         Allergies  Allergies   Allergen Reactions     Latex        Social History  Social History     Socioeconomic History     Marital status: Single     Spouse name: Not on file     Number of children: Not on file     Years of education: Not on file     Highest education level: Not on file   Occupational History     Not on file   Tobacco Use     Smoking status: Never     Smokeless tobacco: Never   Substance and Sexual Activity     Alcohol use: No     Drug use: No     Sexual activity: Not on file   Other Topics Concern     Not on file   Social History Narrative     Not on file     Social Determinants of Health     Financial Resource Strain: Not on file   Food Insecurity: Not on file   Transportation Needs: Not on file   Physical Activity: Not on file   Stress: Not  on file   Social Connections: Not on file   Interpersonal Safety: Not on file   Housing Stability: Not on file       Family History  No family history on file.    Review of Systems  The complete review of systems is negative other than noted in the HPI or here.   Anesthesia Evaluation   Pt has had prior anesthetic. Type: General.    History of anesthetic complications  - difficult airway.      ROS/MED HX  ENT/Pulmonary:    (-) tobacco use, asthma and MANUELA risk factors   Neurologic: Comment: Cerebral Palsy and severely developmentally delayed.  Non verbal and non-weight bearing/wheelchair bound. Full transfer with Carina lift.     (+)       seizures, last seizure: unsure,                        Cardiovascular:  - neg cardiovascular ROS  (-) taking anticoagulants/antiplatelets   METS/Exercise Tolerance:  Comment: METS<1.    Hematologic:     (+)      anemia,       (-) history of blood clots and history of blood transfusion   Musculoskeletal: Comment: Evaluated in urgent care on 3/25/24 for contusion with associated soft tissue swelling potentially from a mild impact during transfer or resting her elbow on her wheelchair consistently.      GI/Hepatic: Comment: Nutrition via G-J tube. NPO.    (-) liver disease   Renal/Genitourinary:  - neg Renal ROS     Endo:  - neg endo ROS     Psychiatric/Substance Use:  - neg psychiatric ROS     Infectious Disease:  - neg infectious disease ROS     Malignancy:  - neg malignancy ROS     Other:            There were no vitals taken for this visit.    Physical Exam >>Limited exam as patinet with significant extremity contractures and wheelchair bound.  Unable to follow commands>>unable to cooperate.   Constitutional: no apparent distress, teeth grinding and drooling  Eyes: Pupils equal, round and reactive to light, extra ocular muscles grossly intact.   HENT: Normocephalic, oral pharynx unable to evaluate as patient not able to cooperate.   Respiratory: Clear to auscultation bilaterally,  no crackles or wheezing.  Cardiovascular: Regular rate and rhythm, normal S1 and S2, and no murmur noted.  Carotids +2, no bruits.   GI: Normal bowel sounds, soft, non-distended, non-tender, no masses palpated, no hepatosplenomegaly.  Surgical scars: well healed.  G-J tube intact.   Lymph/Hematologic: No cervical lymphadenopathy and no supraclavicular lymphadenopathy.  Skin: Warm and dry. .   Musculoskeletal: wheel chair bound with significant contractures of extremities.    Neurologic: Awake and alert. Non verbal and non ambulatory.   Neuropsychiatric: Calm, unable to cooperate to do limited cognitive function.     Prior Labs/Diagnostic Studies   All labs and imaging personally reviewed   Primidone and Metabolite  Order: [id number]  Component  Ref Range & Units 6 mo ago   Primidone Level  5.0 - 12.0 ug/mL 9.2   Comment: INTERPRETIVE INFORMATION: Primidone and Metabolite      Primidone concentrations greater than 15 ug/mL in conjunction  with therapeutic levels of Phenobarbital may be associated with  toxicity.  Performed At: [facility]  [place] [address]  Medical Director: ANDRÉS BUTLER DO  CLIA Number: [id number]   Phenobarbital, Serum  15.0 - 40.0 ug/mL 24.5   Comment: REFERENCE INTERVAL: Phenobarbital      Access complete set of age- and/or gender-specific reference  intervals for this test in the [facility] Test Directory  ([email or web address]).  Performed At: [facility]  [place] [address]  Medical Director: ANDRÉS BUTLER DO  CLIA Number: [id number]   [facility]     Specimen Collected: 10/06/23  8:23 AM       Liver Panel(Hepatic Function Panel)  Order: [id number]  Component  Ref Range & Units 7 mo ago   Alkaline Phosphatase  40 - 150 U/L 131   Bilirubin, Total  0.2 - 1.2 mg/dL 0.2   Bilirubin, Direct  0.0 - 0.5 mg/dL 0.1   AST (SGOT)  10 - 40 U/L 31    ALT (SGPT)  <=55 U/L 41   Protein, Total  6.4 - 8.3 g/dL 7.4   Albumin  3.5 - 5.0 g/dL 3.7   Resulting Agency [facility]       Specimen Collected: 08/29/23  8:48 AM     tains abnormal data Basic Metabolic Panel  Order: [id number]  Component  Ref Range & Units 7 mo ago Resulting Agency   Sodium  136 - 145 mmol/L 138 [facility]   Potassium  3.5 - 5.1 mmol/L 5.0 [facility]   Chloride  98 - 109 mmol/L 102 [facility]   CO2  20 - 29 mmol/L 28 [facility]   Anion Gap  7 - 16 mmol/L 8 [facility]   Calcium  8.4 - 10.4 mg/dL 9.5 [facility]   BUN  7 - 26 mg/dL 15 [facility]   Creatinine  0.55 - 1.02 mg/dL 0.42 Low  [facility]   Glucose  70 - 100 mg/dL 93 [facility]   Comment: The given reference range is for the fasting state. Non-fasting reference range for glucose is 70 - 180 mg/dL.   GFR, Estimated  >60 mL/min/1.73m2 >60 [facility]   Hours Fasting  8 - 12 Hours 12.0 [facility]     Specimen Collected: 08/29/23  8:48 AM    Performed by: [facility]      Complete Blood Count-W/Diff  Order: [id number]  Component  Ref Range & Units 7 mo ago   WBC  3.5 - 10.5 x10(9)/L 6.0   RBC  3.90 - 5.03 x10(12)/L 4.98   Hemoglobin  12.0 - 15.5 g/dL 15.4   HCT  34.9 - 44.5 % 46.0 High    MCV  80.0 - 100.0 fL 92.4   MCH  27.6 - 33.3 pg 30.9   MCHC  31.5 - 35.2 g/dL 33.5   RDW  11.9 - 15.5 % 11.8 Low    Platelets  150 - 450 x10(9)/L 253   Automated NRBC  <=0 /100 WBC 0   Neutrophil Absolute  1.7 - 7.0 10(9)/L 3.3   Lymphocyte Absolute  1.0 - 4.8 10(9)/L 1.8   Monocyte Absolute  0.2 - 0.9 10(9)/L 0.4   Eosinophil Absolute  0.0 - 0.5 10(9)/L 0.5   Basophil Absolute  0.0 - 0.3 10(9)/L 0.1   Immature Granulocyte %  0.0 - 0.5 % 0.2   Resulting Agency [facility]     Specimen Collected: 08/29/23  8:48 AM     The patient's records and results personally reviewed by this provider.     Outside records reviewed from:  Care Everywhere    LAB/DIAGNOSTIC STUDIES TODAY:  not indicated    Assessment    Ivanna Davis is a 49 year old female seen as a PAC referral for risk assessment and optimization for anesthesia.    Plan/Recommendations  Pt will be optimized for the proposed procedure.  See below for details on the assessment, risk, and preoperative recommendations    NEUROLOGY  - Cerebral palsy, spastic quadriplegia and severe developmentally disabled   ~ Non-weight bearing/full transfer with Carina lift.    ~ Fall precautions  ~ Wheel chair bound  ~ Non-verbal and NPO   ~ Baclofen for spasms   ~ Incontinent of bowel and bladder>>Depends undergarment    - Seizure disorder   ~ Seizure present with drooling and turning of the head per Ms. Christos LPN at [facility]. Typically occur ~1X/week with most recent seizure on 3/21/2024.  Keppra and primidone for maintenance with lorazepam for break through seizures.  Followed by Dr. Jesika Cox.     - Chronic Pain   ~ Acetaminophen      -Post Op delirium risk factors:  History of pre-existing cognitive dysfunction    ENT  - Dental caries   ~ above procedure schedule.    - Patient has previous history of complicated airway.   Patient has needed fiberoptic in the past.   Per Record review:  08/08/19; 1220; Mask Ventilation:  (not attempted); Ease of Intubation: Easy; Airway Size: 6;  Cuffed;  Nasal, SUSANNAH;  Blade Type: Fiberoptic Bronchoscope;  Place by: Shannan;  Insertion Attempts: 1;  Secured at (cm)to lip: 26 cm (at nare);  Breath Sounds: Equal, clear and bilateral;  End Tidal CO2: Present;  Dentition: Unchanged;  Grade View of Cords: 1;  Airway Adjuncts:  Fiber optic   Mallampati: Unable to assess  TM: > 3    CARDIAC  - No history of CAD, Hypertension, and Afib    - METS (Metabolic Equivalents)  Patient CANNOT perform 4 METS exercise without symptoms             Total Score: 1    Functional Capacity: Unable to complete 4 METS      - RCRI-Very low risk: Class 1 0.4% complication rate   "           Total Score: 0        PULMONARY  - MANUELA Low Risk             Total Score: 0      - Denies asthma or inhaler use    - Tobacco History    History   Smoking Status     Never   Smokeless Tobacco     Never       GI  - Patient is NPO.  Nutritional support via GJ tube   ~ Isosource 5X/day ( 7 am, 11 am, 3 pm, 7 pm and 11 pm).    - Denies h/o GERD    - PONV Medium Risk  Total Score: 2           1 AN PONV: Pt is Female    1 AN PONV: Patient is not a current smoker        /RENAL  - Baseline Creatinine  see above     ENDOCRINE    - BMI: Estimated body mass index is 18.37 kg/m  as calculated from the following:    Height as of 4/3/24: 1.473 m (4' 10\").    Weight as of 4/3/24: 39.9 kg (87 lb 14.4 oz).  Underweight (BMI < 18.5)  - No history of Diabetes Mellitus    HEME  - VTE Low Risk 0.26%             Total Score: 0      - No history of abnormal bleeding or antiplatelet use.    - Iron deficient anemia on iron replacement    ACCESS  - For PIV placement, LUE more contracted that RUE.      Different anesthesia methods/types have been discussed with the patient, but they are aware that the final plan will be decided by the assigned anesthesia provider on the date of service.    The patient is optimized for their procedure. AVS with information on surgery time/arrival time, meds and NPO status given by nursing staff. No further diagnostic testing indicated.      On the day of service:     Prep time: 20 minutes  Visit time: 13 minutes  Documentation time: 20 minutes  ------------------------------------------  Total time: 53 minutes      ZULEYMA Blackwood CNP  Preoperative Assessment Center  [facility]  Phone: [phone]  Fax: [phone]    Physical Exam    Airway   unable to assess          Respiratory Devices and Support         Dental           Cardiovascular             Pulmonary               Anesthesia Plan    ASA Status:  3       Anesthesia Type: General.     " - Airway: ETT         Techniques and Equipment:     - Airway: Video-Laryngoscope, Fiberoptic Bronchoscope       Consents    Anesthesia Plan(s) and associated risks, benefits, and realistic alternatives discussed. Questions answered and patient/representative(s) expressed understanding.     - Discussed: Risks, Benefits and Alternatives for BOTH SEDATION and the PROCEDURE were discussed     - Discussed with:  Parent (Mother and/or Father)      - Extended Intubation/Ventilatory Support Discussed: No.      - Patient is DNR/DNI Status: No     Use of blood products discussed: No .     Postoperative Care    Pain management: IV analgesics, Oral pain medications.   PONV prophylaxis: Ondansetron (or other 5HT-3), Dexamethasone or Solumedrol     Comments:    Other Comments: PAC evaluation reviewed and appreciated.  Will plan for GETA with flexible bronchoscopy.  Plan discussed with mother via telephone consent.    Dr. Shell Man MD  Anesthesiology

## 2024-04-11 ENCOUNTER — HOSPITAL ENCOUNTER (OUTPATIENT)
Facility: CLINIC | Age: 49
Discharge: HOME | End: 2024-04-11
Attending: DENTIST | Admitting: DENTIST
Payer: MEDICARE

## 2024-04-11 ENCOUNTER — ANESTHESIA (OUTPATIENT)
Dept: SURGERY | Facility: CLINIC | Age: 49
End: 2024-04-11
Payer: MEDICARE

## 2024-04-11 VITALS
WEIGHT: 88.63 LBS | RESPIRATION RATE: 9 BRPM | TEMPERATURE: 98.4 F | HEIGHT: 58 IN | BODY MASS INDEX: 18.6 KG/M2 | DIASTOLIC BLOOD PRESSURE: 86 MMHG | HEART RATE: 86 BPM | SYSTOLIC BLOOD PRESSURE: 141 MMHG | OXYGEN SATURATION: 99 %

## 2024-04-11 PROBLEM — G40.309 GENERALIZED CONVULSIVE EPILEPSY (H): Status: ACTIVE | Noted: 2018-12-04

## 2024-04-11 PROBLEM — J94.2 HEMOTHORAX ON RIGHT: Status: ACTIVE | Noted: 2018-12-04

## 2024-04-11 PROBLEM — T88.4XXA HARD TO INTUBATE: Status: ACTIVE | Noted: 2024-04-11

## 2024-04-11 PROBLEM — N20.0 CALCULUS OF KIDNEY: Status: ACTIVE | Noted: 2018-12-04

## 2024-04-11 PROBLEM — N13.1 HYDRONEPHROSIS DUE TO OBSTRUCTION OF URETER: Status: ACTIVE | Noted: 2018-12-11

## 2024-04-11 PROBLEM — A41.9 SEPSIS (H): Status: ACTIVE | Noted: 2018-12-04

## 2024-04-11 PROBLEM — G80.9 INFANTILE CEREBRAL PALSY (H): Status: ACTIVE | Noted: 2018-12-04

## 2024-04-11 PROBLEM — N12 PYELONEPHRITIS: Status: ACTIVE | Noted: 2018-12-11

## 2024-04-11 PROBLEM — F79 INTELLECTUAL DISABILITY: Status: ACTIVE | Noted: 2018-12-04

## 2024-04-11 PROBLEM — G40.919 REFRACTORY EPILEPSY (H): Status: ACTIVE | Noted: 2024-04-11

## 2024-04-11 PROBLEM — Z91.81 AT HIGH RISK FOR FALLS: Status: ACTIVE | Noted: 2022-12-06

## 2024-04-11 PROCEDURE — 41899 UNLISTED PX DENTALVLR STRUX: CPT | Performed by: NURSE ANESTHETIST, CERTIFIED REGISTERED

## 2024-04-11 PROCEDURE — 710N000012 HC RECOVERY PHASE 2, PER MINUTE: Performed by: DENTIST

## 2024-04-11 PROCEDURE — 370N000017 HC ANESTHESIA TECHNICAL FEE, PER MIN: Performed by: DENTIST

## 2024-04-11 PROCEDURE — 250N000009 HC RX 250: Performed by: NURSE ANESTHETIST, CERTIFIED REGISTERED

## 2024-04-11 PROCEDURE — 360N000075 HC SURGERY LEVEL 2, PER MIN: Performed by: DENTIST

## 2024-04-11 PROCEDURE — 250N000025 HC SEVOFLURANE, PER MIN: Performed by: DENTIST

## 2024-04-11 PROCEDURE — 710N000010 HC RECOVERY PHASE 1, LEVEL 2, PER MIN: Performed by: DENTIST

## 2024-04-11 PROCEDURE — 41899 UNLISTED PX DENTALVLR STRUX: CPT | Performed by: ANESTHESIOLOGY

## 2024-04-11 PROCEDURE — 250N000011 HC RX IP 250 OP 636: Mod: JZ | Performed by: ANESTHESIOLOGY

## 2024-04-11 PROCEDURE — 999N000141 HC STATISTIC PRE-PROCEDURE NURSING ASSESSMENT: Performed by: DENTIST

## 2024-04-11 PROCEDURE — 250N000009 HC RX 250: Performed by: ANESTHESIOLOGY

## 2024-04-11 PROCEDURE — 258N000003 HC RX IP 258 OP 636: Performed by: NURSE ANESTHETIST, CERTIFIED REGISTERED

## 2024-04-11 PROCEDURE — 250N000009 HC RX 250: Performed by: DENTIST

## 2024-04-11 PROCEDURE — 250N000011 HC RX IP 250 OP 636: Performed by: NURSE ANESTHETIST, CERTIFIED REGISTERED

## 2024-04-11 RX ORDER — ALBUTEROL SULFATE 0.83 MG/ML
2.5 SOLUTION RESPIRATORY (INHALATION) EVERY 4 HOURS PRN
Status: DISCONTINUED | OUTPATIENT
Start: 2024-04-11 | End: 2024-04-11 | Stop reason: HOSPADM

## 2024-04-11 RX ORDER — DEXAMETHASONE SODIUM PHOSPHATE 4 MG/ML
INJECTION, SOLUTION INTRA-ARTICULAR; INTRALESIONAL; INTRAMUSCULAR; INTRAVENOUS; SOFT TISSUE PRN
Status: DISCONTINUED | OUTPATIENT
Start: 2024-04-11 | End: 2024-04-11

## 2024-04-11 RX ORDER — OXYCODONE HCL 5 MG/5 ML
5 SOLUTION, ORAL ORAL
Status: DISCONTINUED | OUTPATIENT
Start: 2024-04-11 | End: 2024-04-11 | Stop reason: HOSPADM

## 2024-04-11 RX ORDER — FENTANYL CITRATE 50 UG/ML
INJECTION, SOLUTION INTRAMUSCULAR; INTRAVENOUS PRN
Status: DISCONTINUED | OUTPATIENT
Start: 2024-04-11 | End: 2024-04-11

## 2024-04-11 RX ORDER — NALOXONE HYDROCHLORIDE 0.4 MG/ML
0.1 INJECTION, SOLUTION INTRAMUSCULAR; INTRAVENOUS; SUBCUTANEOUS
Status: DISCONTINUED | OUTPATIENT
Start: 2024-04-11 | End: 2024-04-11 | Stop reason: HOSPADM

## 2024-04-11 RX ORDER — GLYCOPYRROLATE 0.2 MG/ML
INJECTION, SOLUTION INTRAMUSCULAR; INTRAVENOUS PRN
Status: DISCONTINUED | OUTPATIENT
Start: 2024-04-11 | End: 2024-04-11

## 2024-04-11 RX ORDER — FENTANYL CITRATE 50 UG/ML
50 INJECTION, SOLUTION INTRAMUSCULAR; INTRAVENOUS EVERY 5 MIN PRN
Status: DISCONTINUED | OUTPATIENT
Start: 2024-04-11 | End: 2024-04-11 | Stop reason: HOSPADM

## 2024-04-11 RX ORDER — ONDANSETRON 2 MG/ML
4 INJECTION INTRAMUSCULAR; INTRAVENOUS EVERY 30 MIN PRN
Status: DISCONTINUED | OUTPATIENT
Start: 2024-04-11 | End: 2024-04-11 | Stop reason: HOSPADM

## 2024-04-11 RX ORDER — PROPOFOL 10 MG/ML
INJECTION, EMULSION INTRAVENOUS PRN
Status: DISCONTINUED | OUTPATIENT
Start: 2024-04-11 | End: 2024-04-11

## 2024-04-11 RX ORDER — BUPIVACAINE HYDROCHLORIDE AND EPINEPHRINE 5; 5 MG/ML; UG/ML
INJECTION, SOLUTION PERINEURAL PRN
Status: DISCONTINUED | OUTPATIENT
Start: 2024-04-11 | End: 2024-04-11 | Stop reason: HOSPADM

## 2024-04-11 RX ORDER — FENTANYL CITRATE 50 UG/ML
25 INJECTION, SOLUTION INTRAMUSCULAR; INTRAVENOUS EVERY 5 MIN PRN
Status: DISCONTINUED | OUTPATIENT
Start: 2024-04-11 | End: 2024-04-11 | Stop reason: HOSPADM

## 2024-04-11 RX ORDER — OXYMETAZOLINE HYDROCHLORIDE 0.05 G/100ML
SPRAY NASAL PRN
Status: DISCONTINUED | OUTPATIENT
Start: 2024-04-11 | End: 2024-04-11

## 2024-04-11 RX ORDER — BACITRACIN ZINC 500 [USP'U]/G
OINTMENT TOPICAL PRN
Status: DISCONTINUED | OUTPATIENT
Start: 2024-04-11 | End: 2024-04-11 | Stop reason: HOSPADM

## 2024-04-11 RX ORDER — ONDANSETRON 2 MG/ML
INJECTION INTRAMUSCULAR; INTRAVENOUS PRN
Status: DISCONTINUED | OUTPATIENT
Start: 2024-04-11 | End: 2024-04-11

## 2024-04-11 RX ORDER — SODIUM CHLORIDE, SODIUM LACTATE, POTASSIUM CHLORIDE, CALCIUM CHLORIDE 600; 310; 30; 20 MG/100ML; MG/100ML; MG/100ML; MG/100ML
INJECTION, SOLUTION INTRAVENOUS CONTINUOUS PRN
Status: DISCONTINUED | OUTPATIENT
Start: 2024-04-11 | End: 2024-04-11

## 2024-04-11 RX ORDER — ONDANSETRON 4 MG/1
4 TABLET, ORALLY DISINTEGRATING ORAL EVERY 30 MIN PRN
Status: DISCONTINUED | OUTPATIENT
Start: 2024-04-11 | End: 2024-04-11 | Stop reason: HOSPADM

## 2024-04-11 RX ORDER — KETAMINE HYDROCHLORIDE 10 MG/ML
INJECTION INTRAMUSCULAR; INTRAVENOUS PRN
Status: DISCONTINUED | OUTPATIENT
Start: 2024-04-11 | End: 2024-04-11

## 2024-04-11 RX ORDER — SODIUM CHLORIDE, SODIUM LACTATE, POTASSIUM CHLORIDE, CALCIUM CHLORIDE 600; 310; 30; 20 MG/100ML; MG/100ML; MG/100ML; MG/100ML
INJECTION, SOLUTION INTRAVENOUS CONTINUOUS
Status: DISCONTINUED | OUTPATIENT
Start: 2024-04-11 | End: 2024-04-11 | Stop reason: HOSPADM

## 2024-04-11 RX ADMIN — SODIUM CHLORIDE, POTASSIUM CHLORIDE, SODIUM LACTATE AND CALCIUM CHLORIDE: 600; 310; 30; 20 INJECTION, SOLUTION INTRAVENOUS at 10:57

## 2024-04-11 RX ADMIN — MIDAZOLAM 1 MG: 1 INJECTION INTRAMUSCULAR; INTRAVENOUS at 11:40

## 2024-04-11 RX ADMIN — PHENYLEPHRINE HYDROCHLORIDE 100 MCG: 10 INJECTION INTRAVENOUS at 12:29

## 2024-04-11 RX ADMIN — DEXAMETHASONE SODIUM PHOSPHATE 8 MG: 4 INJECTION, SOLUTION INTRA-ARTICULAR; INTRALESIONAL; INTRAMUSCULAR; INTRAVENOUS; SOFT TISSUE at 11:51

## 2024-04-11 RX ADMIN — FENTANYL CITRATE 10 MCG: 50 INJECTION INTRAMUSCULAR; INTRAVENOUS at 11:35

## 2024-04-11 RX ADMIN — Medication 10 MG: at 11:11

## 2024-04-11 RX ADMIN — NALOXONE HYDROCHLORIDE 0.1 MG: 0.4 INJECTION, SOLUTION INTRAMUSCULAR; INTRAVENOUS; SUBCUTANEOUS at 15:47

## 2024-04-11 RX ADMIN — PROPOFOL 50 MG: 10 INJECTION, EMULSION INTRAVENOUS at 11:38

## 2024-04-11 RX ADMIN — PHENYLEPHRINE HYDROCHLORIDE 100 MCG: 10 INJECTION INTRAVENOUS at 12:46

## 2024-04-11 RX ADMIN — NALOXONE HYDROCHLORIDE 0.1 MG: 0.4 INJECTION, SOLUTION INTRAMUSCULAR; INTRAVENOUS; SUBCUTANEOUS at 15:41

## 2024-04-11 RX ADMIN — ONDANSETRON 4 MG: 2 INJECTION INTRAMUSCULAR; INTRAVENOUS at 13:52

## 2024-04-11 RX ADMIN — Medication 10 MG: at 10:57

## 2024-04-11 RX ADMIN — MIDAZOLAM 1 MG: 1 INJECTION INTRAMUSCULAR; INTRAVENOUS at 10:57

## 2024-04-11 RX ADMIN — OXYMETAZOLINE HYDROCHLORIDE 2 SPRAY: 0.05 SPRAY NASAL at 11:09

## 2024-04-11 RX ADMIN — GLYCOPYRROLATE 0.2 MG: 0.2 INJECTION, SOLUTION INTRAMUSCULAR; INTRAVENOUS at 11:09

## 2024-04-11 RX ADMIN — Medication 10 MG: at 11:27

## 2024-04-11 RX ADMIN — PHENYLEPHRINE HYDROCHLORIDE 100 MCG: 10 INJECTION INTRAVENOUS at 12:10

## 2024-04-11 RX ADMIN — PHENYLEPHRINE HYDROCHLORIDE 100 MCG: 10 INJECTION INTRAVENOUS at 12:25

## 2024-04-11 RX ADMIN — LIDOCAINE HYDROCHLORIDE 3 ML: 40 INJECTION, SOLUTION RETROBULBAR; TOPICAL at 10:05

## 2024-04-11 RX ADMIN — LIDOCAINE HYDROCHLORIDE 3 ML: 40 INJECTION, SOLUTION RETROBULBAR; TOPICAL at 10:27

## 2024-04-11 ASSESSMENT — ACTIVITIES OF DAILY LIVING (ADL)
ADLS_ACUITY_SCORE: 39
ADLS_ACUITY_SCORE: 35
ADLS_ACUITY_SCORE: 35
ADLS_ACUITY_SCORE: 41

## 2024-04-11 ASSESSMENT — LIFESTYLE VARIABLES: TOBACCO_USE: 0

## 2024-04-11 ASSESSMENT — ENCOUNTER SYMPTOMS: SEIZURES: 1

## 2024-04-11 NOTE — ANESTHESIA PROCEDURE NOTES
Airway       Patient location during procedure: OR       Procedure Start/Stop Times: 4/11/2024 11:36 AM  Staff -        Anesthesiologist:  Shell Man MD       CRNA: Angelica Mc APRN CRNA       Other Anesthesia Staff: Adrian Morrow DO       Performed By: anesthesiologist  Consent for Airway        Urgency: elective  Indications and Patient Condition       Indications for airway management: koffi-procedural       Induction type:intravenous       Mask difficulty assessment: 0 - not attempted (unable to get adequate mask ventilation d/t limited mouth opening)    Final Airway Details       Final airway type: endotracheal airway       Successful airway: ETT - single, Nasal and SUSANNAH  Endotracheal Airway Details        ETT size (mm): 6.0       Cuffed: yes       Successful intubation technique: flexible bronchoscopy       Position: Right       Measured from: nares       Secured at (cm): 25       Bite block used: None    Post intubation assessment        Placement verified by: capnometry, equal breath sounds and chest rise        Number of attempts at approach: 4 or more       Secured with: tape       Ease of procedure: difficult       Dentition: Intact and Unchanged    Medication(s) Administered   Medication Administration Time: 4/11/2024 11:36 AM    Additional Comments        Minimal mouth opening with hx of difficult mask.  Nasal awake fiberoptic intubation initiated and met with difficulty due to copious amounts of thick secretions. Patient suctioned multiple times; VSS. Drugs per MAR and successful intubation with positive ETCO2 and BBS.

## 2024-04-11 NOTE — ANESTHESIA POSTPROCEDURE EVALUATION
Patient: Ivanna Davis    Procedure: Procedure(s):  Bilateral dental exam,  Two Dental Extractions, periodontal therapy, fluoride varnish in the mouth       Anesthesia Type:  General    Note:  Disposition: Outpatient   Postop Pain Control: Uneventful            Sign Out: Well controlled pain   PONV: No   Neuro/Psych: Uneventful            Sign Out: Acceptable/Baseline neuro status   Airway/Respiratory:             Sign Out: Acceptable/Baseline resp. status   CV/Hemodynamics: Uneventful            Sign Out: Acceptable CV status; No obvious hypovolemia; No obvious fluid overload   Other NRE: NONE   DID A NON-ROUTINE EVENT OCCUR? No       Last vitals:  Vitals Value Taken Time   /69 04/11/24 1615   Temp 36.9  C (98.4  F) 04/11/24 1409   Pulse 87 04/11/24 1630   Resp 53 04/11/24 1630   SpO2 97 % 04/11/24 1630   Vitals shown include unfiled device data.    Electronically Signed By: Shell Man MD  April 11, 2024  4:41 PM

## 2024-04-11 NOTE — PROVIDER NOTIFICATION
Last seizure was this morning per caregiver.  It was excessive drooling and yelling out, sometimes she does have arm movements with her seizures.  Her seizure lasted for about 1 minutes and 20 seconds and they did not give ativan as they don't give unless the seizure lasts for greater than 3 minutes.  Dr. Man was notified of seizure.  No seizure pads available in pre-op, called [facility] to get some up for patient's eye cart.

## 2024-04-11 NOTE — ANESTHESIA CARE TRANSFER NOTE
Patient: Ivanna Davis    Procedure: Procedure(s):  Bilateral dental exam,  Two Dental Extractions, periodontal therapy, fluoride varnish in the mouth       Diagnosis: Dental infection [K04.7]  Diagnosis Additional Information: No value filed.    Anesthesia Type:   General     Note:    Oropharynx: oropharynx clear of all foreign objects and spontaneously breathing  Level of Consciousness: drowsy  Oxygen Supplementation: face mask  Level of Supplemental Oxygen (L/min / FiO2): 6  Independent Airway: airway patency satisfactory and stable  Dentition: dentition unchanged  Vital Signs Stable: post-procedure vital signs reviewed and stable  Report to RN Given: handoff report given  Patient transferred to: PACU  Comments: .Anesthesia Care Transfer Note    Patient: Ivanna Davis    Transferred to: PACU    Patient vital signs: stable    Airway: none    Monitors applied, VSS.  Patient awake and comfortable, breathing spontaneously.  Report given to RN with transfer of care.        Angelica Mc CRNA  4/11/2024  2:17 PM      Handoff Report: Identifed the Patient, Identified the Reponsible Provider, Reviewed the pertinent medical history, Discussed the surgical course, Reviewed Intra-OP anesthesia mangement and issues during anesthesia, Set expectations for post-procedure period and Allowed opportunity for questions and acknowledgement of understanding  Vitals:  Vitals Value Taken Time   /72 04/11/24 1415   Temp 36.9  C (98.4  F) 04/11/24 1409   Pulse 85 04/11/24 1416   Resp 13 04/11/24 1416   SpO2 100 % 04/11/24 1416   Vitals shown include unfiled device data.    Electronically Signed By: ZULEYMA Thornton CRNA  April 11, 2024  2:16 PM

## 2024-04-11 NOTE — BRIEF OP NOTE
[facility]    Brief Operative Note    Pre-operative diagnosis: Dental infection [K04.7]  Post-operative diagnosis Same as pre-operative diagnosis    Procedure: Bilateral dental exam,  Two Dental Extractions, periodontal therapy, fluoride varnish in the mouth, Bilateral - Mouth    Surgeon: Surgeons and Role:     * Yael Heard DDS - Primary     * Crys Figueroa MD - Resident - Assisting  Anesthesia: General   Estimated Blood Loss: 10ml    Drains: None  Specimens: * No specimens in log *  Findings:   None.  Complications: None.  Implants: * No implants in log *

## 2024-04-11 NOTE — DISCHARGE INSTRUCTIONS

## 2024-04-11 NOTE — OP NOTE
[facility]   OR Dental Procedure Note    Patient:   Ivanna Davis    Date of birth 1975   MRN:  [id number]   Date of Visit:  04/11/2024   Date of Admission 4/11/2024     Treatment Completed   General Anesthesia Start:  Ivanna Davis is a 49 year old female brought into the operating room and draped in the usual customary [facility] fashion. Following the time-out procedures, the patient was placed under general anesthesia care via Right naris.    Under GA, the following treatments were performed:   Bilateral dental examination,   No radiographs were taken due to her limited mouth opening.   Moist throat pack was placed behind the mandibular and maxillary arches, unable to place in oropharynx due to limited mouth opening, betadine applied circumferentially to oral cavity.   Pre-procedural rinse included: Chlorhexidine 0.12% solution followed by a 50/50 solution of sterile saline and hydrogen peroxide.    Periodontal Treatment: Full mouth Gross debridement of biofilm: Bulk debridement completed with hand scalers on all surfaces. Lingual calculus cannot be assessed fully due to her opening limitation.       Local Anesthetic:  1.8_cc  Marcaine 0.5% w/epi 1:200,000  via infiltration on the Right Maxilla and Left Maxilla    Extractions of teeth #4-upper right 2nd premolar and #12-upper left 1st premolar  Elevated gingiva with #9 periosteal elevator. Luxated tooth with series of straight elevators. Tooth removed completely with dental forceps. Digital compression of cortical bone performed. Gauze hemostasis achieved by way of pressure.    Throat pack placed at: 1212  Throat pack removed at: 1343  The oropharynx was inspected and found to be clear.   Estimated blood loss: 1 (mL)      Dental procedure Finish:  After the dental procedure, the patient was transferred to recovery room in good condition under the lead of the CRNA.The patient s family was  informed by the dental team about the dental findings and procedures performed. Verbal and written post-op instructions given. All questions and concerns were invited and answered, patient and patient's family left pleased with treatment.       Clinic contact information:   [facility]  [facility]  [address] [place] [address] [address]  Phone:[phone]    Pre - Procedure   Patient name: Ivanna Davis  : 1975  Medical record number:  [id number]  Dental procedures performed on: 2024  It was deemed necessary for this patient to be seen in the hospital operating room because pt is not able to be seen in clinic due to: Developmental Delays    Pre-procedure with patient & guardian:   Consent: Risks complications including but not limited to post-operative pain, swelling, bleeding, infection, temporary/permanent paresthesia/anesthesia of CN V3, lingual nerve, failure to resolve chief complaint. Patient's guardian(s) agreed to procedure as written and signed consent after all questions were invited and answered.    Providers     Dental attending:Yael Heard DDS, [facility] [employer]  Dental resident Crys Figueroa MD, DMD, Fellow  Dental resident/student:  Anesthesiologist:Shell Man MD   CRNA:Angelica Mc APRN CRNA   Anesthesiology resident:  Charla RN:   Circulator RN:       Patient review   Patient Health history: History is obtained from the patient's parent(s)  The 10 point Review of Systems is negative other than noted in the HPI and pertinent information mentioned above.     History of Present Illness:  Ivanna Davis is a 49 year old female who presents to the OR for comprehensive dental treatment.     ASA 3 - Patient with moderate systemic disease with functional limitations    Physical Exam:  Vitals were reviewed  Temp: 98.4  F (36.9  C) Temp src: Axillary BP: 124/72 Pulse: 92   Resp:  19 SpO2: 100 % O2 Device: Simple face mask Oxygen Delivery: 7 LPM    Medical, Surgical, Social History       Past Medical History:   Diagnosis Date    Generalized convulsive epilepsy (H)     Infantile cerebral palsy (H)     Kyphoscoliosis     Mental retardation          Past Surgical History:   Procedure Laterality Date    BACK SURGERY      EXAM UNDER ANESTHESIA BREAST Bilateral 8/8/2019    Procedure: Bilateral Breast Exam Under Anesthesia;  Surgeon: Hortensia Recio MD;  Location: [facility] OR    EXAM UNDER ANESTHESIA PELVIC N/A 8/8/2019    Procedure: Breast and Pelvic Exam Under Anesthesia, Pap Smear;  Surgeon: Hortensia Recio MD;  Location: [facility] OR    EXAM UNDER ANESTHESIA, RESTORATIONS, EXTRACTION(S) DENTAL COMPLEX, COMBINED N/A 8/8/2019    Procedure: Dental Exam, Periodontal Therapy and Fluoride Treatment;  Surgeon: Ladonna Colorado DDS;  Location: [facility] OR    EXAM UNDER ANESTHESIA, RESTORATIONS, EXTRACTION(S) DENTAL COMPLEX, COMBINED N/A 12/7/2022    Procedure: Periodontal Therapy, Bilateral Dental Exam, 1 Dental Extractions, Debridement;  Surgeon: Viki Ruiz DDS;  Location: [facility] OR    IR GASTRO JEJUNOSTOMY TUBE PLACEMENT           Social History     Tobacco Use    Smoking status: Never    Smokeless tobacco: Never   Substance Use Topics    Alcohol use: No         History reviewed. No pertinent family history.      Immunizations and Allergies     Immunization History   Administered Date(s) Administered    COVID-19 12+ (2023-24) (MODERNA) 11/11/2023    COVID-19 Bivalent 12+ (Pfizer) 09/12/2022    COVID-19 MONOVALENT 12+ (Pfizer) 12/22/2021    COVID-19 Monovalent 18+ (Moderna) 03/01/2021, 03/29/2021    Flu, Unspecified 10/15/2016    Historical DTP/aP 1975, 03/02/1977, 07/12/1978, 09/15/1980    Influenza (H1N1) 01/22/2010    Influenza (IIV3) PF 10/20/2000, 10/03/2001, 11/19/2002, 10/16/2003, 10/26/2004, 11/14/2005, 10/25/2006, 10/26/2007, 10/21/2008, 10/01/2009, 11/15/2010, 12/01/2010, 10/19/2011,  10/19/2012    Influenza Vaccine >6 months,quad, PF 02/11/2015, 09/25/2015, 10/25/2018    Influenza Vaccine, 6+MO IM (QUADRIVALENT W/PRESERVATIVES) 09/27/2019, 09/12/2022    Influenza, seasonal, injectable, PF 11/03/2016, 11/10/2017    Influenza,INJ,MDCK,PF,Quad >6mo(Flucelvax) 10/13/2020, 10/08/2021, 11/11/2023    MMR 06/01/1977, 09/01/1977, 08/12/1991    OPV, trivalent, live 1975, 03/02/1977, 07/12/1978, 09/15/1980    Pneumo Conj 13-V (2010&after) 09/25/2015    Pneumococcal 23 valent 12/08/2004    TDAP (Adacel,Boostrix) 03/09/2011, 12/06/2022    Td (Adult), Adsorbed 01/01/1990, 09/10/1992, 12/08/2004         Allergies   Allergen Reactions    Latex          Medication     Prior to Admission Medications       Current Facility-Administered Medications   Medication Dose Route Frequency Provider Last Rate Last Admin    albuterol (PROVENTIL) neb solution 2.5 mg  2.5 mg Nebulization Q4H PRN Shell Man MD        fentaNYL (PF) (SUBLIMAZE) injection 25 mcg  25 mcg Intravenous Q5 Min PRN Shell Man MD        fentaNYL (PF) (SUBLIMAZE) injection 50 mcg  50 mcg Intravenous Q5 Min PRN Shell Man MD        lactated ringers infusion   Intravenous Continuous Shell Man MD        naloxone (NARCAN) injection 0.1 mg  0.1 mg Intravenous Q2 Min PRN Shell Man MD        naloxone (NARCAN) injection 0.1 mg  0.1 mg Intravenous Q2 Min PRN Shell Man MD        ondansetron (ZOFRAN ODT) ODT tab 4 mg  4 mg Oral Q30 Min PRN Shell Man MD        Or    ondansetron (ZOFRAN) injection 4 mg  4 mg Intravenous Q30 Min PRN Shell Man MD        ondansetron (ZOFRAN ODT) ODT tab 4 mg  4 mg Oral Q30 Min PRN Shell Man MD        Or    ondansetron (ZOFRAN) injection 4 mg  4 mg Intravenous Q30 Min PRN Shell Man MD        oxyCODONE (ROXICODONE) solution 5 mg  5 mg Oral Once PRN Shell Man MD        prochlorperazine (COMPAZINE) injection 5 mg  5 mg Intravenous  Q6H Shell Ivan MD             Exam and Assessment    Ivanna Davis is a 49 year old female that presented to the OR at [facility] due to Pre-procedure diagnosis: Chronic periodontitis, unspecified K05.30    Extra-Oral:  No submandibular lymphadenopathy, no induration or erythema, no abnormal skin lesions, inferior border of mandible is palpable bilaterally, Maximum mouth opening is approximately 10mm. Physical impediment from TMJ bilaterally. No clicking of TMJ on opening and lateral movements.    Intraoral exam: Reveals moderate plaque, very heavy calculus, heavy bleeding on probing, clinical decay cannot be assessed fully, poor dentition. Generalized grade 1 mobility and Localized grade 3 mobility on #s 4 and 12.. Generalized wear on posterior and anterior.       Probing depth range (mm):  Unable to probe due to limited opening.     Radiographic exam: No radiograph were captured at this visit.     Abnormal findings include: very limited opening, maximum inter-incisal opening ~ 10mm. With very heavy calculus. Consider ordering Dental CT same day prior to procedures for [facility] OR [facility] 12 months. Discussed with care giver and guardian that most aggressive option will be full mouth extractions since she is fully G tube dependent. Other options will be cleaning and wait and see until her tooth/teeth become symptomatic and consider extraction/extractions.     Pt did have a seizure during anesthesia. Pt shook for less than 30 seconds. Informed family of this occurrence.    The post-operative diagnosis was Chronic periodontitis, unspecified K05.30

## 2024-04-11 NOTE — PROVIDER NOTIFICATION
Patient is full care.  Legs contractured up underneath her.  Her arms and pulled up close to her body, but are able to be straightened some.

## 2025-01-13 NOTE — DISCHARGE INSTRUCTIONS
Same-Day Surgery   Adult Discharge Orders & Instructions     For 24 hours after surgery:  Get plenty of rest.  A responsible adult must stay with you for at least 24 hours after you leave the hospital.   Pain medication can slow your reflexes. Do not drive or use heavy equipment.  If you have weakness or tingling, don't drive or use heavy equipment until this feeling goes away.  Mixing alcohol and pain medication can cause dizziness and slow your breathing. It can even be fatal. Do not drink alcohol while taking pain medication.  Avoid strenuous or risky activities.  Ask for help when climbing stairs.   You may feel lightheaded.  If so, sit for a few minutes before standing.  Have someone help you get up.   If you have nausea (feel sick to your stomach), drink only clear liquids such as apple juice, ginger ale, broth or 7-Up.  Rest may also help.  Be sure to drink enough fluids.  Move to a regular diet as you feel able. Take pain medications with a small amount of solid food, such as toast or crackers, to avoid nausea.   A slight fever is normal. Call the doctor if your fever is over 100 F (37.7 C) (taken under the tongue) or lasts longer than 24 hours.  You may have a dry mouth, muscle aches, trouble sleeping or a sore throat.  These symptoms should go away after 24 hours.  Do not make important or legal decisions.   Pain Management:      1. Take pain medication (if prescribed) for pain as directed by your physician.        2. WARNING: If the pain medication you have been prescribed contains Tylenol  (acetaminophen), DO NOT take additional doses of Tylenol (acetaminophen).     Call your doctor for any of the followin.  Signs of infection (fever, growing tenderness at the surgery site, severe pain, a large amount of drainage or bleeding, foul-smelling drainage, redness, swelling).    2.  It has been over 8 to 10 hours since surgery and you are still not able to urinate (pee).    3.  Headache for over 24  hours.    4.  Numbness, tingling or weakness the day after surgery (if you had spinal anesthesia).  To contact a doctor, call __Dr. Viki Ruiz, Dental office at [phone] _ or:  '   [phone] and ask for the Resident On Call for:          ___Dental____ (answered 24 hours a day)  '   Emergency Department:  [facility]: [phone]  [facility]: [phone]               Rev. 10/2014     Yes...

## (undated) DEVICE — LABEL MEDICATION SYSTEM 3303-P

## (undated) DEVICE — LIGHT HANDLE X2

## (undated) DEVICE — SOL NACL 0.9% IRRIG 1000ML BOTTLE 2F7124

## (undated) DEVICE — SYR EAR BULB 3OZ 0035830

## (undated) DEVICE — LINEN GOWN X4 5410

## (undated) DEVICE — SOLIDIFIER (USE FOR UP TO 1500CC) MSOLID1500

## (undated) DEVICE — SOL HYDROGEN PEROXIDE 3% 4OZ BOTTLE F0010

## (undated) DEVICE — COVER PROBE ULTRASOUND 3D W/GEL 5X96" LF 20-P3D596

## (undated) DEVICE — POSITIONER ARMBOARD FOAM 1PAIR LF FP-ARMB1

## (undated) DEVICE — TUBING SUCTION MEDI-VAC 1/4"X20' N620A

## (undated) DEVICE — BASIN SET MAJOR

## (undated) DEVICE — PACK SET-UP STD 9102

## (undated) DEVICE — PREP POVIDONE IODINE SOLUTION 10% 120ML

## (undated) DEVICE — RX BACITRACIN OINTMENT 0.9G 1/32OZ 01680 11109

## (undated) DEVICE — SUCTION TIP YANKAUER W/O VENT K86

## (undated) DEVICE — GLOVE BIOGEL PI MICRO SZ 7.0 48570

## (undated) DEVICE — SOL WATER IRRIG 1000ML BOTTLE 2F7114

## (undated) DEVICE — TOOTHBRUSH ADULT NON STERILE MDS136850

## (undated) DEVICE — SPONGE RAY-TEC 4X8" 7318

## (undated) DEVICE — LINEN GOWN OVERSIZE 5408

## (undated) DEVICE — PREP POVIDONE IODINE SWABSTICKS TRIPLE PACK MDS093902A

## (undated) DEVICE — SYR 10ML LL W/O NDL 302995

## (undated) DEVICE — RX BACITRACIN OINTMENT 0.9G 1/32OZ CUR001109

## (undated) DEVICE — GLOVE BIOGEL PI MICRO SZ 6.5 48565

## (undated) DEVICE — STRAP KNEE/BODY 31143004

## (undated) DEVICE — DRSG KERLIX FLUFFS X5

## (undated) DEVICE — PREP POVIDONE-IODINE 10% SOLUTION 4OZ BOTTLE MDS093944

## (undated) DEVICE — PREP POVIDONE IODINE 10% SWABSTICKS TRIPLE PACK AS-PVPSBPT

## (undated) DEVICE — GOWN IMPERVIOUS SPECIALTY XLG/XLONG 32474

## (undated) DEVICE — PAD CHUX UNDERPAD 30X36" P3036C

## (undated) DEVICE — SYR 10ML FINGER CONTROL W/O NDL 309695

## (undated) DEVICE — PREP POVIDONE IODINE SOLUTION 10% 4OZ BOTTLE 29906-004

## (undated) DEVICE — SUCTION CANISTER MEDIVAC LINER 1500ML W/LID 65651-515

## (undated) DEVICE — SYR EAR 3OZ BULB IRR STRL DISP BLU PVC 4173

## (undated) DEVICE — ANTIFOG SOLUTION W/FOAM PAD 31142527

## (undated) DEVICE — GLOVE BIOGEL PI MICRO INDICATOR UNDERGLOVE SZ 7.5 48975

## (undated) DEVICE — LINEN ORTHO PACK 5446

## (undated) DEVICE — SPONGE SURGIFOAM 100 1974

## (undated) DEVICE — GLOVE PROTEXIS W/NEU-THERA 6.5  2D73TE65

## (undated) DEVICE — Device

## (undated) DEVICE — TONGUE DEPRESSOR STERILE 25-705-ALL

## (undated) RX ORDER — PROPOFOL 10 MG/ML
INJECTION, EMULSION INTRAVENOUS
Status: DISPENSED
Start: 2019-08-08

## (undated) RX ORDER — LIDOCAINE HYDROCHLORIDE 20 MG/ML
INJECTION, SOLUTION EPIDURAL; INFILTRATION; INTRACAUDAL; PERINEURAL
Status: DISPENSED
Start: 2019-08-08

## (undated) RX ORDER — KETAMINE HYDROCHLORIDE 100 MG/ML
INJECTION, SOLUTION INTRAMUSCULAR; INTRAVENOUS
Status: DISPENSED
Start: 2019-08-08

## (undated) RX ORDER — SODIUM CHLORIDE, SODIUM LACTATE, POTASSIUM CHLORIDE, CALCIUM CHLORIDE 600; 310; 30; 20 MG/100ML; MG/100ML; MG/100ML; MG/100ML
INJECTION, SOLUTION INTRAVENOUS
Status: DISPENSED
Start: 2024-04-11

## (undated) RX ORDER — OXYMETAZOLINE HYDROCHLORIDE 0.05 G/100ML
SPRAY NASAL
Status: DISPENSED
Start: 2019-08-08

## (undated) RX ORDER — CHLORHEXIDINE GLUCONATE ORAL RINSE 1.2 MG/ML
SOLUTION DENTAL
Status: DISPENSED
Start: 2019-08-08

## (undated) RX ORDER — FENTANYL CITRATE 50 UG/ML
INJECTION, SOLUTION INTRAMUSCULAR; INTRAVENOUS
Status: DISPENSED
Start: 2019-08-08

## (undated) RX ORDER — FENTANYL CITRATE 50 UG/ML
INJECTION, SOLUTION INTRAMUSCULAR; INTRAVENOUS
Status: DISPENSED
Start: 2022-12-07

## (undated) RX ORDER — OXYMETAZOLINE HYDROCHLORIDE 0.05 G/100ML
SPRAY NASAL
Status: DISPENSED
Start: 2024-04-11

## (undated) RX ORDER — NALOXONE HYDROCHLORIDE 0.4 MG/ML
INJECTION, SOLUTION INTRAMUSCULAR; INTRAVENOUS; SUBCUTANEOUS
Status: DISPENSED
Start: 2024-04-11

## (undated) RX ORDER — EPHEDRINE SULFATE 50 MG/ML
INJECTION, SOLUTION INTRAMUSCULAR; INTRAVENOUS; SUBCUTANEOUS
Status: DISPENSED
Start: 2019-08-08

## (undated) RX ORDER — FENTANYL CITRATE-0.9 % NACL/PF 10 MCG/ML
PLASTIC BAG, INJECTION (ML) INTRAVENOUS
Status: DISPENSED
Start: 2022-12-07

## (undated) RX ORDER — KETOROLAC TROMETHAMINE 30 MG/ML
INJECTION, SOLUTION INTRAMUSCULAR; INTRAVENOUS
Status: DISPENSED
Start: 2022-12-07

## (undated) RX ORDER — ONDANSETRON 2 MG/ML
INJECTION INTRAMUSCULAR; INTRAVENOUS
Status: DISPENSED
Start: 2019-08-08

## (undated) RX ORDER — GLYCOPYRROLATE 0.2 MG/ML
INJECTION INTRAMUSCULAR; INTRAVENOUS
Status: DISPENSED
Start: 2019-08-08

## (undated) RX ORDER — KETAMINE HCL IN 0.9 % NACL 50 MG/5 ML
SYRINGE (ML) INTRAVENOUS
Status: DISPENSED
Start: 2019-08-08

## (undated) RX ORDER — CHLORHEXIDINE GLUCONATE ORAL RINSE 1.2 MG/ML
SOLUTION DENTAL
Status: DISPENSED
Start: 2024-04-11

## (undated) RX ORDER — PHENYLEPHRINE HCL IN 0.9% NACL 1 MG/10 ML
SYRINGE (ML) INTRAVENOUS
Status: DISPENSED
Start: 2019-08-08

## (undated) RX ORDER — DEXAMETHASONE SODIUM PHOSPHATE 4 MG/ML
INJECTION, SOLUTION INTRA-ARTICULAR; INTRALESIONAL; INTRAMUSCULAR; INTRAVENOUS; SOFT TISSUE
Status: DISPENSED
Start: 2019-08-08

## (undated) RX ORDER — FENTANYL CITRATE 50 UG/ML
INJECTION, SOLUTION INTRAMUSCULAR; INTRAVENOUS
Status: DISPENSED
Start: 2024-04-11

## (undated) RX ORDER — CEFAZOLIN SODIUM 1 G/3ML
INJECTION, POWDER, FOR SOLUTION INTRAMUSCULAR; INTRAVENOUS
Status: DISPENSED
Start: 2019-08-08